# Patient Record
Sex: MALE | Race: WHITE
[De-identification: names, ages, dates, MRNs, and addresses within clinical notes are randomized per-mention and may not be internally consistent; named-entity substitution may affect disease eponyms.]

---

## 2018-03-20 ENCOUNTER — HOSPITAL ENCOUNTER (EMERGENCY)
Dept: HOSPITAL 75 - ER | Age: 18
Discharge: LEFT BEFORE DISCHARGE | End: 2018-03-20
Payer: MEDICAID

## 2018-03-20 ENCOUNTER — HOSPITAL ENCOUNTER (EMERGENCY)
Dept: HOSPITAL 75 - ER | Age: 18
Discharge: HOME | End: 2018-03-20
Payer: SELF-PAY

## 2018-03-20 VITALS — WEIGHT: 180 LBS | BODY MASS INDEX: 26.66 KG/M2 | HEIGHT: 69 IN

## 2018-03-20 DIAGNOSIS — M25.552: Primary | ICD-10-CM

## 2018-03-20 LAB
BASOPHILS # BLD AUTO: 0 10^3/UL (ref 0–0.1)
BASOPHILS NFR BLD AUTO: 1 % (ref 0–10)
BUN/CREAT SERPL: 16
CALCIUM SERPL-MCNC: 9.5 MG/DL (ref 8.5–10.1)
CHLORIDE SERPL-SCNC: 105 MMOL/L (ref 98–107)
CO2 SERPL-SCNC: 25 MMOL/L (ref 21–32)
CREAT SERPL-MCNC: 0.86 MG/DL (ref 0.6–1.3)
EOSINOPHIL # BLD AUTO: 0.1 10^3/UL (ref 0–0.3)
EOSINOPHIL NFR BLD AUTO: 1 % (ref 0–10)
ERYTHROCYTE [DISTWIDTH] IN BLOOD BY AUTOMATED COUNT: 12 % (ref 10–14.5)
GFR SERPLBLD BASED ON 1.73 SQ M-ARVRAT: > 60 ML/MIN
GLUCOSE SERPL-MCNC: 85 MG/DL (ref 70–105)
HCT VFR BLD CALC: 46 % (ref 40–54)
HGB BLD-MCNC: 15.7 G/DL (ref 13.3–17.7)
LYMPHOCYTES # BLD AUTO: 2.1 X 10^3 (ref 1–4)
LYMPHOCYTES NFR BLD AUTO: 34 % (ref 12–44)
MANUAL DIFFERENTIAL PERFORMED BLD QL: NO
MCH RBC QN AUTO: 32 PG (ref 25–34)
MCHC RBC AUTO-ENTMCNC: 34 G/DL (ref 32–36)
MCV RBC AUTO: 94 FL (ref 80–99)
MONOCYTES # BLD AUTO: 0.5 X 10^3 (ref 0–1)
MONOCYTES NFR BLD AUTO: 8 % (ref 0–12)
NEUTROPHILS # BLD AUTO: 3.5 X 10^3 (ref 1.8–7.8)
NEUTROPHILS NFR BLD AUTO: 56 % (ref 42–75)
PLATELET # BLD: 236 10^3/UL (ref 130–400)
PMV BLD AUTO: 10.4 FL (ref 7.4–10.4)
POTASSIUM SERPL-SCNC: 3.9 MMOL/L (ref 3.6–5)
RBC # BLD AUTO: 4.92 10^6/UL (ref 4.35–5.85)
SODIUM SERPL-SCNC: 138 MMOL/L (ref 135–145)
WBC # BLD AUTO: 6.2 10^3/UL (ref 4.3–11)

## 2018-03-20 PROCEDURE — 73502 X-RAY EXAM HIP UNI 2-3 VIEWS: CPT

## 2018-03-20 PROCEDURE — 99281 EMR DPT VST MAYX REQ PHY/QHP: CPT

## 2018-03-20 PROCEDURE — 86141 C-REACTIVE PROTEIN HS: CPT

## 2018-03-20 PROCEDURE — 36415 COLL VENOUS BLD VENIPUNCTURE: CPT

## 2018-03-20 PROCEDURE — 85025 COMPLETE CBC W/AUTO DIFF WBC: CPT

## 2018-03-20 PROCEDURE — 80048 BASIC METABOLIC PNL TOTAL CA: CPT

## 2018-03-20 NOTE — XMS REPORT
Encounter CCD: 2016 to 2017

 Created on: 2098



Rich Miller

External Reference #: [id number]

: 2000

Sex: Male



Demographics







 Address  [address] [place] [address]-

 

 Home Phone  [phone]

 

 Preferred Language  Unknown

 

 Marital Status  Single

 

 Yarsani Affiliation  None

 

 Race  White

 

 Ethnic Group  Not  or 





Author







 Author  Auto Generated

 

 Organization  [facility]

 

 Address  Unknown

 

 Phone  Unavailable







Care Team Providers







 Care Team Member Name  Role  Phone

 

 Provider, Unknown  CP  [phone]

 

 Arianna Basilio  PP  [phone]







Allergies, Adverse Reactions, Alerts







  



  Substance   Reaction   Status

 

  



  No Known Adverse Reactions    Active







Problem List







  



  Condition   Effective Dates   Status

 

  



  Acute nontraumatic kidney injury    Active

 

  



  Acute renal failure   2016   Active







Medications







    



  Medication   Instructions   Start Date   End Date   Status

 

    



  influenza virus   10/15/15 15:21:00 CDT, Routine, 0.5   10/15/2015   10/15/
2015   Completed



  vaccine, inactivated   mL, IM, 1 time only, 1 dose(s),   



   Stop date 10/15/15 15:21:00 CDT   







Immunizations







   



  Vaccine   Date   Status   Refusal Reason

 

   



  Influenza Virus, Inactivated   10/15/2015   Given

## 2018-03-20 NOTE — XMS REPORT
Continuity of Care Document

 Created on: 2018



BRIANDA LEON

External Reference #: [id number]

: 2000

Sex: Male



Demographics







 Address  [address] [place] [address]

 

 Home Phone  [phone]

 

 Preferred Language  Unknown

 

 Marital Status  Unknown

 

 Mandaen Affiliation  Unknown

 

 Race  Unknown

 

 Ethnic Group  Unknown





Author







 Author  [employer]

 

 Organization  [facility]

 

 Address  Unknown

 

 Phone  Unavailable







Care Team Providers







 Care Team Member Name  Role  Phone

 

 [employer]  Unavailable  Unavailable



                                    



Problems

                    





 Problem                          Status                          Onset Date   
                       Classification                          Date Reported   
                       Comments                          Source                
    

 

 Pre-renal acute kidney injury (disorder)                          Active      
                    2016                          Problem                
          2017                                                    [facility]                    

 

 Acute nontraumatic kidney injury (disorder)                          Active   
                                                 Problem                       
   2016                                                    [facility]                    

 

 No current problems or disability (context-dependent category)                
          Active                                                    Problem    
                      10/11/2015                                               
     [facility]                    



                                                                               
                                     



Medications

                    





 Medication                          Details                          Route    
                      Status                          Patient Instructions     
                     Ordering Provider                          Order Date     
                     Source                    

 

 influenza virus vaccine, inactivated                          10/15/15 15:21:
00 CDT, Routine, 0.5 mL, IM, 1 time only, 1 dose(s), Stop date 10/15/15 15:21:
00 CDT                                                    Inactive             
                                       [facility]                    



                                                                               
     



Allergies, Adverse Reactions, Alerts

                                                        



Immunizations

                    





 Immunization                          Date Given                          Site
                          Status                          Last Updated         
                 Comments                          Source                    

 

 Influenza Virus, Inactivated                          10/15/2015              
                                      completed                          
[facility]                    



                                                                               
     



Results

                    





 Order Name                          Results                          Value    
                      Reference Range                          Date            
              Interpretation                          Comments                 
         Source                    

 

 N Micro                          WBC Ur                          1-4          
                                            2016                          
[facility]                    

 

 N Micro                          RBC Ur                          1-4          
                                            2016                          
[facility]                    

 

 NUA                          Color Ur                          OTHER          
                                            2016                          
[facility]                    

 

 NUA                          Clarity Ur                          SL CLOUDY    
                                                  2016                   
       [facility]                    

 

 NUA                          Glucose Ur                          NEGATIVE     
                                                 2016                    
      [facility]                    

 

 NUA                          Ketones Ur                          NEGATIVE     
                                                 2016                    
      [facility]                    

 

 NUA                          Specific Gravity Ur                          
1.025                                                      2016          
                [facility]                    

 

 NUA                          pH Ur                          6.0               
                                       2016                          [facility]                    

 

 NUA                          Protein Ur                          TRACE        
                                              2016                       
   [facility]                    

 

 NUA                          Nitrite Ur                          NEGATIVE     
                                                 2016                    
      [facility]                    

 

 NUA                          Blood Ur                          NEGATIVE       
                                               2016                      
    [facility]                    

 

 NUA                          Leukocytes Ur                          NEGATIVE  
                                                    2016                 
         [facility]                    

 

 NUA                          N Add Micro                          Yes         
                                             2016                        
  [facility]                    

 

 Creat U                          Creatinine Ur Random                          
189.2 mg/dL                                                     2016     
                     [facility]                    

 

 Prot U                          Protein Ur Random                          7 mg
/dL                                                     2016             
             [facility]                    

 

 Prot U                          Protein/Creatinine Ur Random                  
        0.04                                                      2016   
                       [facility]                    

 

 Extra Ur                          Extra Urine                          Extra 
Specimen                                                      2016       
                   NA                          Added by [employer]

                          [facility]                
    

 

 BasMet                          Sodium                          139 mmol/L    
                       135 - 145                          2016           
               [facility]                    

 

 BasMet                          Potassium                          4.0 mmol/L 
                          3.5 - 5.2                          2016        
                  [facility]                    

 

 BasMet                          Chloride                          102 mmol/L  
                         99 - 112                          2016          
                [facility]                    

 

 BasMet                          Carbon Dioxide                          27 mmol
/L                           20 - 30                          2016       
                   [facility]                    

 

 BasMet                          Anion Gap                          10 mmol/L  
                         7 - 14                          2016            
              [facility]                    

 

 BasMet                          Calcium                          9.5 mg/dL    
                       8.6 - 10.5                          2016          
                [facility]                    

 

 BasMet                          Glucose                          88 mg/dL     
                      65 - 110                          2016             
             [facility]                    

 

 BasMet                          BUN                          20 mg/dL         
                  5 - 20                          2016                   
       [facility]                    

 

 BasMet                          Creatinine                          1.03 mg/dL
                           .35 - 1.13                          2016      
                    [facility]                    

 

 Hem                          Sample Hgb Level                          <15 mg/
dL                            - <=100                          2016      
                    [facility]                    

 

 CBC                          WBC                          8.87 x10(3) mcL     
                      4.50 - 11.00                          2016         
                 [facility]                    

 

 CBC                          RBC                          4.73 x10(6) mcL     
                      4.50 - 5.90                          2016          
                [facility]                    

 

 CBC                          HGB                          15.2 gm/dL          
                 13.5 - 17.5                          2016               
           [facility]                    

 

 CBC                          HCT                          43.9 %              
             41.0 - 53.0                          2016                   
       [facility]                    

 

 CBC                          MCV                          92.8 fL             
              82.0 - 100.0                          2016                 
         [facility]                    

 

 CBC                          MCH                          32.1 pg             
              26.0 - 34.0                          2016                  
        [facility]                    

 

 CBC                          MCHC                          34.6 gm/dL         
                  31.5 - 36.5                          2016              
            [facility]                    

 

 CBC                          RDW                          11.9 %              
             11.5 - 14.5                          2016                   
       [facility]                    

 

 CBC                          Platelet                          207 x10(3) mcL 
                          150 - 450                          2016        
                  [facility]                    

 

 CBC                          MPV                          10.8 fL             
              8.2 - 12.4                          2016                   
       [facility]                    

 

 Nephrology Letter                          Nephrology Letter                  
        2016



Kristin Johanson, MD

 [address] [place] [address] 



Re:   BRIANDA LEON

:  2000

[facility]#: [id number]



Dear Dr. Johanson:



I saw Brianda Leon in our Nephrology Clinic on 2016.  He was 
accompanied by his paternal grandfather.  He was here for follow-up following 
discharge from the hospital for acute kidney injury.



Brianda is a 15-year 9-month old boy.  In brief, he was seen in the local 
Emergency Room on 2015, for emesis, nausea and vague pain. On 
presentation to emergency room, he had a serum creatinine of 3 mg/dL.  There 
was a concern for appendicitis and he underwent a CT scan with contrast, which 
showed mesenteric adenitis and a mass over the iliac bone.  The serum 
creatinine continued to rise in the local hospital and had increased from 3.0 
to 3.4 mg/dL to 4.0 mg/dL, which led to transfer of his care to [facility].  His blood work on admission on 2015, showed a 
sodium of 139, potassium 4.5, chloride 107, and CO2 of 23 mmol/L.  The BUN and 
creatinine were 27 and 4.01 mg/dL, respectively.  His urinalysis showed a 
specific gravity of 1.004 and a pH of 5.5.  The urine dipstick was negative for 
blood glucose, protein, nitrites, ketones and leukocytes.  The urine protein/ 
creatinine ratio was 0.33.  His serum complements were normal (C3-109 mg/dL and 
C4 of 19.7 mg/dL).  His SERGEI titers, p-ANCA and C- ANCA titers were negative.  
His liver enzymes were normal.  His serum LDH, creatine kinase and uric acid 
were normal.  He underwent a renal ultrasound with Doppler on 2015, 
which showed echogenic kidneys with the right kidney measuring 12.1 cm and the 
left kidney measuring 11.2 cm.  The renal Doppler flow was normal.  We observed 
him in the hospital.  He was on a restricted potassium and phosphorus diet 
given his acute renal failure. He continued to have good urine output with 
stable electrolytes.  He was discharged on a renal diet with plans to have a 
follow-up blood work in a weeks time. His discharge blood work on October 10, 
2015, showed sodium 139, potassium 4.5, chloride 101 and CO2 of 28 mmol/L.  The 
BUN and creatinine had decreased down to 18 and 1.34 mg/dL.  



The blood work done on October 15, 2015, showed sodium 141, potassium 4.7, 
chloride 99 and CO2 of 29 mmol/L.  The BUN and creatinine were 14 and 1.55 mg/
dL.



The blood work on 2015 showed sodium 141, potassium 4.8, chloride 
101, and CO2 28 mmol/L. The BUN and creatinine were 17 and 1.01 mg/dL.  Serum 
calcium and phosphorus were 10.5 and 3.7 mg/dL respectively with a serum PTH of 
94 pg/mL.  His blood count showed a hemoglobin of 15.5 g/dL with a hematocrit 
of 44.8%.  His white cell count was 5,480 and a platelet count of 196,000. The 
serum albumin was 5.0 g/dl. The serum Vitamin D level was 30 ng/mL. The urine 
protein/ creatinine ratio was normal (0.06)



The blood work on 2015 showed sodium 141, potassium 4.3, chloride 
100, and CO2 27 mmol/L.  The BUN and creatinine were 16 and 1.19 mg/dL.  Serum 
phosphorus was 3.8 mg/dL. The blood count showed a hemoglobin of 15.2 g/dL with 
a hematocrit of 47.0%.  His white cell count was 4,700 and a platelet count of 
246,000. 



The blood work on 2016 showed sodium 139, potassium 4.6, chloride 
102, and CO2 28 mmol/L.  The BUN and creatinine were 19 and 0.96 mg/dL. The 
urine protein/ creatinine ratio was normal (0.05). The follow up renal 
ultrasound completed on 16 at an outside facility was reported to be 
normal.  



He was last seen on 2016. He has done well in the interim period. 
He denies having changes in his urine volume, abnormal urine smell or 
appearance. He denies voiding symptoms or hematuria.



The orthopedic team was consulted for the bone mass, and went on to have an MRI 
scan on their recommendation.  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 
unicameral bone cyst, nonossifying fibroma, etc. He was seen by Orthopedic 
surgeon (Dr Fernandes) in [facility] in 2015. He underwent a bone 
biopsy which ruled out a malignancy. He underwent an injection. The last 
evaluation showed marked improvement in the bone lesion. He is to have a follow 
up with him in 2016. 



The past medical history is significant for hospitalization at [facility] with acute kidney injury from  to October 10, 2015.  His 
surgical procedures have consisted of surgical excision of stye over his right 
eye and bone biopsy. He does not have any other chronic illness.



He is the oldest of the children.  He has 2 full brothers and 1 full sister, 
and one half-sister.

The family history is negative for kidney disease or kidney stone on the 
paternal side of the family. The family history is incomplete on the maternal 
side of the family, but the paternal grandparents are not aware of any kidney 
disease or kidney stone on the maternal side of the family.  



He lives with his grandparents.  He is in 12th grade. They have moved to [facility].



All other review of systems not mentioned in HPI are negative.



He is not allergic to any medications.

His current medications are:

No Medications



On examination, his blood pressure was 119/66 mmHg.  His height is 178.4 cm (
63rd percentile), weight 75.1 kg (75th percentile) and a BMI of 23.6 kg/sq m (
72nd percentile).

HEENT:  The eye examination was normal. Pupils were round and reactive. The EOM 
were complete and normal. The ear drums were normal. Clear palate, supple neck 
with no lymphadenopathy or thyromegaly.

CHEST:  The heart sounds were normal. There were no murmurs. The lung fields 
were clear to auscultation.  

ABDOMEN:  The abdomen was soft on palpation.  There was no palpable kidney, 
bladder, or abdominal mass. 

EXTERNAL GENITALIA: The external genital examination was deferred.

BACK:  Examination of the back was normal, showing no abnormalities and no CVA 
tenderness. 

EXTREMITIES:  Extremities were normal, without evidence of rash, edema, or 
joint abnormalities.  Range of motion was normal in all four extremities.  No 
tenderness was noted over the musculoskeletal system.



Urinalysis performed today showed a specific gravity of 1.025 and a pH of 6.  
The urine dipstick shored trace protein and was negative for glucose, ketones, 
nitrites, blood, and leukocytes.  The urine sediment was unremarkable.



In summary, Brianda Leon is a 15-year 9-month old boy who is being followed in 
the Nephrology Clinic for acute kidney injury in 2015, possibly from 
acute tubular interstitial nephritis and further aggravated by contrast-induced 
nephropathy.



I have recommend that he will have blood work today.  If the serum creatinine 
shows continued decrease and/or has plateaued, then we will repeat his blood 
work in 6 month's time.  If his serum creatinine has risen, then we may 
consider a renal biopsy.  The family will come here prepared for a 4-day stay 
in case we need to treat him with high-dose steroid therapy following the 
biopsy.



I have arranged for him to have blood work and urine chemistry today. 



I would like to see him in 12 months' time for another 2 years with blood and 
urine evaluation before we discharge him from the clinic. Thank you for the 
referral.  I will be glad to answer any questions that you may have regarding 
Brianda's care.



Sincerely,





OCTAVIANO FISCHER MD



CC: Parents of Brianda Leon



Addendum:

The blood work shows normal electrolytes, normal renal function, and normal 
urinary protein excretion. We will repeat his blood work as discussed in 12 
month. We will follow him as scheduled. He does not need a biopsy based on the 
current blood work results. 



Collection: 2016 10:27

CHEMISTRY - URINE

 Creatinine Ur Random                      189.2           mg/dL               
             

 Protein Ur Random                         7               mg/dL               
             

 Protein/Creatinine Ur Random              0.04                                
             

Collection: 2016 11:19

HEMATOLOGY       

 WBC                                       8.87            x10(3) mcL   4.50 - 
11.00        

 HGB                                       15.2            gm/dL        13.5 - 
17.5         

 HCT                                       43.9            %            41.0 - 
53.0         

 Platelet                                  207             x10(3) mcL   150 - 
450           

 RBC                                       4.73            x10(6) mcL   4.50 - 
5.90         

 MCV                                       92.8            fL           82.0 - 
100.0        

 MCH                                       32.1            pg           26.0 - 
34.0         

 MCHC                                      34.6            gm/dL        31.5 - 
36.5         

 RDW                                       11.9            %            11.5 - 
14.5         

 MPV                                       10.8            fL           8.2 - 
12.4          



CHEMISTRY        

 Sodium                                    139             mmol/L       135 - 
145           

 Potassium                                 4.0             mmol/L       3.5 - 
5.2           

 Chloride                                  102             mmol/L       99 - 
112            

 Carbon Dioxide                            27              mmol/L       20 - 30
             

 Anion Gap                                 10              mmol/L       7 - 14 
             

 Calcium                                   9.5             mg/dL        8.6 - 
10.5          

 Glucose                                   88              mg/dL        65 - 
110            

 BUN                                       20              mg/dL        5 - 20 
             

 Creatinine                                1.03            mg/dL        .35 - 
1.13

                                                      2016               
                                     Provider Name: Octaviano Fischer MD

Electronically Signed On:  16 03:46 PM

                          [facility]                
    

 

 CT Pelvis w/o Contrast                          CT Pelvis w/o Contrast        
                 



[facility]

Department of Radiology

 [address] [place] [id number] [phone]



Patient: Brianda Leon



MRN: [id number]

: 2000



Study Date/Time: 2016 10:50:00

Accession: [id number]

Order ID: [id number] 

Procedure Code: [id number]

Procedure Description: CT Pelvis w/o Contrast

Reason for Study: 



Reason for exam: Left ileal lesion, status post biopsy and doxycycline ablation



Comparison: 2016



Technique: Axial images were obtained through the pelvis. Coronal and sagittal

reconstructions were made.



Sedation: None



Findings:



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

demonstrated. The size is stable when compared with the most recent CT. No

pathologic fracture is seen. There appears to be some increased peripheral bone

production within the more inferior and mid lytic components of this lesion when

compared with prior CT scan. This may be related to doxycycline treatment.



Both hips remain well seated and there is no hip joint effusion. Adjacent

muscles appear normal. No new bony lesion is seen.



Impression:



Stable overall size of the left iliac bone expansile lytic and sclerotic lesion.

There appears to be increased peripheral bone production involving the more

inferior and mid lytic components of this lesion. Possibly related to

doxycycline treatment?







Dictated On   : 2016 11:22:50

Interpreted By: Orly Kay ([facility])

Transcribed By: [email or web address]

Signed By     :Orly Kay ([facility]) - 2016 11:53:50

                                                      2016               
                                     Signed (Electronic Signature):  Orly Kay DO  2016 11:53 am

Dictated by:  Orly Kay DO

                          [facility]                
    

 

 N Micro                          WBC Ur                          None         
                                             2016                        
  [facility]                    

 

 N Micro                          RBC Ur                          None         
                                             2016                        
  [facility]                    

 

 N Micro                          Renal Epi Ur                          None   
                                                   2016                  
        [facility]                    

 

 NUA                          Color Ur                          YELLOW         
                                             2016                        
  [facility]                    

 

 NUA                          Clarity Ur                          CLEAR        
                                              2016                       
   [facility]                    

 

 NUA                          Glucose Ur                          NEGATIVE     
                                                 2016                    
      [facility]                    

 

 NUA                          Ketones Ur                          NEGATIVE     
                                                 2016                    
      [facility]                    

 

 NUA                          Specific Gravity Ur                          
1.020                                                      2016          
                [facility]                    

 

 NUA                          pH Ur                          6.0               
                                       2016                          [facility]                    

 

 NUA                          Protein Ur                          NEGATIVE     
                                                 2016                    
      [facility]                    

 

 NUA                          Nitrite Ur                          NEGATIVE     
                                                 2016                    
      [facility]                    

 

 NUA                          Blood Ur                          NEGATIVE       
                                               2016                      
    [facility]                    

 

 NUA                          Leukocytes Ur                          NEGATIVE  
                                                    2016                 
         [facility]                    

 

 Creat U                          Creatinine Ur Random                          
133.3 mg/dL                                                     2016     
                     [facility]                    

 

 Prot U                          Protein Ur Random                          6 mg
/dL                                                     2016             
             [facility]                    

 

 Prot U                          Protein/Creatinine Ur Random                  
        0.05                                                      2016   
                       [facility]                    

 

 BasMet                          Sodium                          139 mmol/L    
                       135 - 145                          2016           
               [facility]                    

 

 BasMet                          Potassium                          4.6 mmol/L 
                          3.5 - 5.2                          2016        
                  [facility]                    

 

 BasMet                          Chloride                          102 mmol/L  
                         99 - 112                          2016          
                [facility]                    

 

 BasMet                          Carbon Dioxide                          28 mmol
/L                           20 - 30                          2016       
                   [facility]                    

 

 BasMet                          Anion Gap                          9 mmol/L   
                        7 - 14                          2016             
             [facility]                    

 

 BasMet                          Calcium                          9.2 mg/dL    
                       8.6 - 10.5                          2016          
                [facility]                    

 

 BasMet                          Glucose                          87 mg/dL     
                      65 - 110                          2016             
             [facility]                    

 

 BasMet                          BUN                          19 mg/dL         
                  5 - 20                          2016                   
       [facility]                    

 

 BasMet                          Creatinine                          .96 mg/dL 
                          .35 - 1.13                          2016       
                   [facility]                    

 

 Vit D250H                          Vitamin D 25-OH D2                          
<5 ng/mL                                                     10/27/2015        
                  [facility]                    

 

 Vit D250H                          Vitamin D 25-OH D3                          
30 ng/mL                                                     10/27/2015        
                  [facility]                    

 

 Vit D250H                          Vitamin D 25-OH D2 D3 (Total)              
            30 ng/mL                           30 - 100                        
  10/27/2015                                                    Total 25-
Hydroxyvitamin D (D2 +D3) levels between 15-29 ng/mL suggest insufficiency, 
while levels <15 ng/mL suggest deficiency

This test was developed and its performance characteristics determined

 by [facility] laboratories.  It has not been cleared or approved by the [place] 

Food and Drug Administration.  The test does not require FDA approval. 

Additional information regarding test use will be provided upon request.

                          [facility]                
    

 

 NUA                          Color Ur                          YELLOW         
                                             10/26/2015                        
  [facility]                    

 

 NUA                          Clarity Ur                          CLEAR        
                                              10/26/2015                       
   [facility]                    

 

 NUA                          Glucose Ur                          NEGATIVE     
                                                 10/26/2015                    
      [facility]                    

 

 NUA                          Ketones Ur                          NEGATIVE     
                                                 10/26/2015                    
      [facility]                    

 

 NUA                          Specific Gravity Ur                          
1.015                                                      10/26/2015          
                [facility]                    

 

 NUA                          pH Ur                          6.0               
                                       10/26/2015                          [facility]                    

 

 NUA                          Protein Ur                          NEGATIVE     
                                                 10/26/2015                    
      [facility]                    

 

 NUA                          Nitrite Ur                          NEGATIVE     
                                                 10/26/2015                    
      [facility]                    

 

 NUA                          Blood Ur                          NEGATIVE       
                                               10/26/2015                      
    [facility]                    

 

 NUA                          Leukocytes Ur                          NEGATIVE  
                                                    10/26/2015                 
         [facility]                    

 

 Creat U                          Creatinine Ur Random                          
107.9 mg/dL                                                     10/26/2015     
                     [facility]                    

 

 Prot U                          Protein Ur Random                          6 mg
/dL                                                     10/26/2015             
             [facility]                    

 

 Prot U                          Protein/Creatinine Ur Random                  
        0.06                                                      10/26/2015   
                       [facility]                    

 

 Ferritin                          Ferritin                          72 ng/mL  
                         27 - 265                          10/26/2015          
                [facility]                    

 

 PTH Intact                          PTH Intact                          25.4 pg
/mL                           10.0 - 89.0                          10/26/2015  
                        [facility]                    

 

 TIBC                          TIBC                          337 mcg/dL        
                   224 - 435                          10/26/2015               
           [facility]                    

 

 BasMet                          Sodium                          141 mmol/L    
                       135 - 145                          10/26/2015           
               [facility]                    

 

 BasMet                          Potassium                          4.8 mmol/L 
                          3.5 - 5.2                          10/26/2015        
                  [facility]                    

 

 BasMet                          Chloride                          101 mmol/L  
                         99 - 112                          10/26/2015          
                [facility]                    

 

 BasMet                          Carbon Dioxide                          28 mmol
/L                           20 - 30                          10/26/2015       
                   [facility]                    

 

 BasMet                          Anion Gap                          12 mmol/L  
                         7 - 14                          10/26/2015            
              [facility]                    

 

 BasMet                          Calcium                          10.5 mg/dL   
                        8.6 - 10.5                          10/26/2015         
                 [facility]                    

 

 BasMet                          Glucose                          84 mg/dL     
                      65 - 110                          10/26/2015             
             [facility]                    

 

 BasMet                          BUN                          17 mg/dL         
                  5 - 20                          10/26/2015                   
       [facility]                    

 

 BasMet                          Creatinine                          1.01 mg/dL
                           .35 - 1.13                          10/26/2015      
                    [facility]                    

 

 BasMet                          Creatinine, Old Calibration                   
       1.2 mg/dL                           0.5 - 1.3                          10
/                                                    This creatinine 
value is a calculated value from the newly implemented IDMS calibration.  It 
represents the value equivalent to what was previously reported by the 
laboratory.

                          [facility]                
    

 

 HepFun                          Protein Total                          8.2 gm/
dL                           6.5 - 8.3                          10/26/2015     
                     [facility]                    

 

 HepFun                          Albumin                          5.0 gm/dL    
                       3.0 - 5.1                          10/26/2015           
               [facility]                    

 

 HepFun                          Bilirubin, Total                          0.7 
mg/dL                           0.0 - 1.2                          10/26/2015  
                        [facility]                    

 

 HepFun                          Bilirubin, Direct                          0.3 
mg/dL                           0.0 - 0.4                          10/26/2015  
                        [facility]                    

 

 HepFun                          Bilirubin, Indirect                          
0.4 mg/dL                           0.0 - 1.2                          10/26/
2015                          [facility]                    

 

 HepFun                          AST                          26 unit/L        
                   12 - 50                          10/26/2015                 
         [facility]                    

 

 HepFun                          ALT                          49 unit/L        
                   5 - 50                          10/26/2015                  
        [facility]                    

 

 HepFun                          Alk Phos                          94 unit/L   
                        50 - 130                          10/26/2015           
               [facility]                    

 

 Iron                          Iron                          105 mcg/dL        
                   50 - 180                          10/26/2015                
          [facility]                    

 

 Phos                          Phosphorus                          3.7 mg/dL   
                        2.3 - 4.8                          10/26/2015          
                [facility]                    

 

 DIFA                          Differential Method                          
Auto Diff                                                      10/26/2015      
                    [facility]                    

 

 CBCD                          WBC                          5.48 x10(3) mcL    
                       4.50 - 11.00                          10/26/2015        
                  [facility]                    

 

 CBCD                          RBC                          4.87 x10(6) mcL    
                       4.50 - 5.90                          10/26/2015         
                 [facility]                    

 

 CBCD                          HGB                          15.5 gm/dL         
                  13.5 - 17.5                          10/26/2015              
            [facility]                    

 

 CBCD                          HCT                          44.8 %             
              41.0 - 53.0                          10/26/2015                  
        [facility]                    

 

 CBCD                          MCV                          92.0 fL            
               82.0 - 100.0                          10/26/2015                
          [facility]                    

 

 CBCD                          MCH                          31.8 pg            
               26.0 - 34.0                          10/26/2015                 
         [facility]                    

 

 CBCD                          MCHC                          34.6 gm/dL        
                   31.5 - 36.5                          10/26/2015             
             [facility]                    

 

 CBCD                          RDW                          12.0 %             
              11.5 - 14.5                          10/26/2015                  
        [facility]                    

 

 CBCD                          Platelet                          196 x10(3) mcL
                           150 - 450                          10/26/2015       
                   [facility]                    

 

 CBCD                          MPV                          11.0 fL            
               8.2 - 12.4                          10/26/2015                  
        [facility]                    

 

 DIFA                          % Neutro                          60.6 %        
                                             10/26/2015                        
  [facility]                    

 

 DIFA                          % Imm Gran                          0.2 %       
                                              10/26/2015                       
   NA                          This number represents the sum of the 
metamyelocytes, myelocytes and promyelocytes.

                          [facility]                
    

 

 DIFA                          % Lymph                          31.6 %         
                                            10/26/2015                          
[facility]                    

 

 DIFA                          % Mono                          6.0 %           
                                          10/26/2015                          
[facility]                    

 

 DIFA                          % Eos                          1.1 %            
                                         10/26/2015                          [facility]                    

 

 DIFA                          % Baso                          0.5 %           
                                          10/26/2015                          
[facility]                    

 

 DIFA                          Abs Neut                          3.32 x10(3) 
mcL                           1.80 - 7.00                          10/26/2015  
                        [facility]                    

 

 DIFA                          Abs Imm Gran                          0.01 x10(3
) mcL                           0.00 - 0.04                          10/26/2015
                          [facility]                    

 

 DIFA                          Abs Lymph                          1.73 x10(3) 
mcL                           1.20 - 4.00                          10/26/2015  
                        [facility]                    

 

 DIFA                          Abs Mono                          0.33 x10(3) 
mcL                           0.10 - 0.80                          10/26/2015  
                        [facility]                    

 

 DIFA                          Abs Eos                          0.06 x10(3) mcL
                           0.00 - 0.50                          10/26/2015     
                     [facility]                    

 

 DIFA                          Abs Baso                          0.03 x10(3) 
mcL                           0.00 - 0.10                          10/26/2015  
                        [facility]                    

 

 BasMet                          Sodium                          141 mmol/L    
                       135 - 145                          10/15/2015           
               [facility]                    

 

 BasMet                          Potassium                          4.7 mmol/L 
                          3.5 - 5.2                          10/15/2015        
                  [facility]                    

 

 BasMet                          Chloride                          99 mmol/L   
                        99 - 112                          10/15/2015           
               [facility]                    

 

 BasMet                          Carbon Dioxide                          29 mmol
/L                           20 - 30                          10/15/2015       
                   [facility]                    

 

 BasMet                          Anion Gap                          13 mmol/L  
                         7 - 14                          10/15/2015            
              [facility]                    

 

 BasMet                          Calcium                          9.9 mg/dL    
                       8.6 - 10.5                          10/15/2015          
                [facility]                    

 

 BasMet                          Glucose                          74 mg/dL     
                      65 - 110                          10/15/2015             
             [facility]                    

 

 BasMet                          BUN                          14 mg/dL         
                  5 - 20                          10/15/2015                   
       [facility]                    

 

 BasMet                          Creatinine                          1.55 mg/dL
                           .35 - 1.13                          10/15/2015      
                    [facility]                    

 

 BasMet                          Creatinine, Old Calibration                   
       1.7 mg/dL                           0.5 - 1.3                          10
/15/2015                          HI                          This creatinine 
value is a calculated value from the newly implemented IDMS calibration.  It 
represents the value equivalent to what was previously reported by the 
laboratory.

                          [facility]                
    

 

 BasMet                          Sodium                          139 mmol/L    
                       135 - 145                          10/10/2015           
               [facility]                    

 

 BasMet                          Potassium                          4.5 mmol/L 
                          3.5 - 5.2                          10/10/2015        
                  [facility]                    

 

 BasMet                          Chloride                          101 mmol/L  
                         99 - 112                          10/10/2015          
                [facility]                    

 

 BasMet                          Carbon Dioxide                          28 mmol
/L                           20 - 30                          10/10/2015       
                   [facility]                    

 

 BasMet                          Anion Gap                          10 mmol/L  
                         7 - 14                          10/10/2015            
              [facility]                    

 

 BasMet                          Calcium                          10.0 mg/dL   
                        8.6 - 10.5                          10/10/2015         
                 [facility]                    

 

 BasMet                          Glucose                          85 mg/dL     
                      65 - 110                          10/10/2015             
             [facility]                    

 

 BasMet                          BUN                          18 mg/dL         
                  5 - 20                          10/10/2015                   
       [facility]                    

 

 BasMet                          Creatinine                          1.34 mg/dL
                           .35 - 1.13                          10/10/2015      
                    [facility]                    

 

 BasMet                          Creatinine, Old Calibration                   
       1.5 mg/dL                           0.5 - 1.3                          10
/10/2015                          HI                          This creatinine 
value is a calculated value from the newly implemented IDMS calibration.  It 
represents the value equivalent to what was previously reported by the 
laboratory.

                          [facility]                
    

 

 BasMet                          Sodium                          143 mmol/L    
                       135 - 145                          10/09/2015           
               [facility]                    

 

 BasMet                          Potassium                          4.8 mmol/L 
                          3.5 - 5.2                          10/09/2015        
                  [facility]                    

 

 BasMet                          Chloride                          106 mmol/L  
                         99 - 112                          10/09/2015          
                [facility]                    

 

 BasMet                          Carbon Dioxide                          25 mmol
/L                           20 - 30                          10/09/2015       
                   [facility]                    

 

 BasMet                          Anion Gap                          12 mmol/L  
                         7 - 14                          10/09/2015            
              [facility]                    

 

 BasMet                          Calcium                          9.7 mg/dL    
                       8.6 - 10.5                          10/09/2015          
                [facility]                    

 

 BasMet                          Glucose                          67 mg/dL     
                      65 - 110                          10/09/2015             
             [facility]                    

 

 BasMet                          BUN                          20 mg/dL         
                  5 - 20                          10/09/2015                   
       [facility]                    

 

 BasMet                          Creatinine                          1.47 mg/dL
                           .35 - 1.13                          10/09/2015      
                    HI                          This test has failed a delta 
check, as defined in the Chemistry Laboratory Policy.

 1.  Investigate possible clerical error.  If found, complete an incident 
report.

The above investigations were performed by [email or web address] at 10/09/2015 11:18:07 CDT.

                          [facility]                
    

 

 BasMet                          Creatinine, Old Calibration                   
       1.7 mg/dL                           0.5 - 1.3                          10
/2015                          HI                          This creatinine 
value is a calculated value from the newly implemented IDMS calibration.  It 
represents the value equivalent to what was previously reported by the 
laboratory.

                          [facility]                
    

 

 DIFA                          Differential Method                          
Auto Diff                                                      10/09/2015      
                    [facility]                    

 

 CBCD                          WBC                          7.77 x10(3) mcL    
                       4.50 - 11.00                          10/09/2015        
                  [facility]                    

 

 CBCD                          RBC                          4.43 x10(6) mcL    
                       4.50 - 5.90                          10/09/2015         
                 LOW                                                    [facility]                    

 

 CBCD                          HGB                          14.3 gm/dL         
                  13.5 - 17.5                          10/09/2015              
            [facility]                    

 

 CBCD                          HCT                          41.2 %             
              41.0 - 53.0                          10/09/2015                  
        [facility]                    

 

 CBCD                          MCV                          93.0 fL            
               82.0 - 100.0                          10/09/2015                
          [facility]                    

 

 CBCD                          MCH                          32.3 pg            
               26.0 - 34.0                          10/09/2015                 
         [facility]                    

 

 CBCD                          MCHC                          34.7 gm/dL        
                   31.5 - 36.5                          10/09/2015             
             [facility]                    

 

 CBCD                          RDW                          12.2 %             
              11.5 - 14.5                          10/09/2015                  
        [facility]                    

 

 CBCD                          Platelet                          163 x10(3) mcL
                           150 - 450                          10/09/2015       
                   [facility]                    

 

 CBCD                          MPV                          11.2 fL            
               8.2 - 12.4                          10/09/2015                  
        [facility]                    

 

 DIFA                          % Neutro                          62.4 %        
                                             10/09/2015                        
  [facility]                    

 

 DIFA                          % Imm Gran                          0.1 %       
                                              10/09/2015                       
   NA                          This number represents the sum of the 
metamyelocytes, myelocytes and promyelocytes.

                          [facility]                
    

 

 DIFA                          % Lymph                          26.9 %         
                                            10/09/2015                          
[facility]                    

 

 DIFA                          % Mono                          8.8 %           
                                          10/09/2015                          
[facility]                    

 

 DIFA                          % Eos                          1.3 %            
                                         10/09/2015                          [facility]                    

 

 DIFA                          % Baso                          0.5 %           
                                          10/09/2015                          
[facility]                    

 

 DIFA                          Abs Neut                          4.85 x10(3) 
mcL                           1.80 - 7.00                          10/09/2015  
                        [facility]                    

 

 DIFA                          Abs Imm Gran                          0.01 x10(3
) mcL                           0.00 - 0.04                          10/09/2015
                          [facility]                    

 

 DIFA                          Abs Lymph                          2.09 x10(3) 
mcL                           1.20 - 4.00                          10/09/2015  
                        NA                                                    
[facility]                    

 

 DIFA                          Abs Mono                          0.68 x10(3) 
mcL                           0.10 - 0.80                          10/09/2015  
                        [facility]                    

 

 DIFA                          Abs Eos                          0.10 x10(3) mcL
                           0.00 - 0.50                          10/09/2015     
                     [facility]                    

 

 DIFA                          Abs Baso                          0.04 x10(3) 
mcL                           0.00 - 0.10                          10/09/2015  
                        [facility]                    

 

 CytNeut Ab                          ANCA-Cytoplasmic                          
Negative                            Negative                          10/08/
2015                          [facility]                    

 

 CytNeut Ab                          ANCA-Perinuclear                          
Negative                            Negative                          10/08/
2015                          NA                          Negative for cANCA 
and pANCA patterns by immunofluorescence.

Test Performed by:

[place] [id number]

: Marlon Josue II, M.D., Ph.D.[facility]                
    

 

 ANCA Panel                          Proteinase 3 Antibody (PR3)               
           <0.2 unit(s)                           <0.4 (Negative)              
            10/08/2015                          NA                          
Test Performed by:

[place] [id number]

: Marlon Josue II, M.D., Ph.D.[facility]                
    

 

 ANCA Panel                          Myeloperoxidase Ab                        
  <0.2 unit(s)                           <0.4 (Negative)                       
   10/08/2015                          [facility]                    

 

 BasMet                          Sodium                          143 mmol/L    
                       135 - 145                          10/08/2015           
               [facility]                    

 

 BasMet                          Potassium                          4.0 mmol/L 
                          3.5 - 5.2                          10/08/2015        
                  [facility]                    

 

 BasMet                          Chloride                          106 mmol/L  
                         99 - 112                          10/08/2015          
                [facility]                    

 

 BasMet                          Carbon Dioxide                          25 mmol
/L                           20 - 30                          10/08/2015       
                   [facility]                    

 

 BasMet                          Anion Gap                          12 mmol/L  
                         7 - 14                          10/08/2015            
              [facility]                    

 

 BasMet                          Calcium                          8.5 mg/dL    
                       8.6 - 10.5                          10/08/2015          
                [facility]                    

 

 BasMet                          Glucose                          94 mg/dL     
                      65 - 110                          10/08/2015             
             [facility]                    

 

 BasMet                          BUN                          24 mg/dL         
                  5 - 20                          10/08/2015                   
       [facility]                    

 

 BasMet                          Creatinine                          2.45 mg/dL
                           .35 - 1.13                          10/08/2015      
                    [facility]                    

 

 BasMet                          Creatinine, Old Calibration                   
       2.7 mg/dL                           0.5 - 1.3                          10
/2015                          HI                          This creatinine 
value is a calculated value from the newly implemented IDMS calibration.  It 
represents the value equivalent to what was previously reported by the 
laboratory.

                          [facility]                
    

 

 DIFA                          Differential Method                          
Auto Diff                                                      10/08/2015      
                    [facility]                    

 

 CBCD                          WBC                          7.54 x10(3) mcL    
                       4.50 - 11.00                          10/08/2015        
                  [facility]                    

 

 CBCD                          RBC                          4.14 x10(6) mcL    
                       4.50 - 5.90                          10/08/2015         
                 [facility]                    

 

 CBCD                          HGB                          13.1 gm/dL         
                  13.5 - 17.5                          10/08/2015              
            [facility]                    

 

 CBCD                          HCT                          38.5 %             
              41.0 - 53.0                          10/08/2015                  
        [facility]                    

 

 CBCD                          MCV                          93.0 fL            
               82.0 - 100.0                          10/08/2015                
          [facility]                    

 

 CBCD                          MCH                          31.6 pg            
               26.0 - 34.0                          10/08/2015                 
         [facility]                    

 

 CBCD                          MCHC                          34.0 gm/dL        
                   31.5 - 36.5                          10/08/2015             
             [facility]                    

 

 CBCD                          RDW                          12.3 %             
              11.5 - 14.5                          10/08/2015                  
        [facility]                    

 

 CBCD                          Platelet                          163 x10(3) mcL
                           150 - 450                          10/08/2015       
                   [facility]                    

 

 CBCD                          MPV                          11.1 fL            
               8.2 - 12.4                          10/08/2015                  
        [facility]                    

 

 DIFA                          % Neutro                          66.0 %        
                                             10/08/2015                        
  [facility]                    

 

 DIFA                          % Imm Gran                          0.1 %       
                                              10/08/2015                       
   NA                          This number represents the sum of the 
metamyelocytes, myelocytes and promyelocytes.

                          [facility]                
    

 

 DIFA                          % Lymph                          23.5 %         
                                            10/08/2015                          
[facility]                    

 

 DIFA                          % Mono                          8.8 %           
                                          10/08/2015                          
[facility]                    

 

 DIFA                          % Eos                          1.3 %            
                                         10/08/2015                          [facility]                    

 

 DIFA                          % Baso                          0.3 %           
                                          10/08/2015                          
[facility]                    

 

 DIFA                          Abs Neut                          4.98 x10(3) 
mcL                           1.80 - 7.00                          10/08/2015  
                        [facility]                    

 

 DIFA                          Abs Imm Gran                          0.01 x10(3
) mcL                           0.00 - 0.04                          10/08/2015
                          [facility]                    

 

 DIFA                          Abs Lymph                          1.77 x10(3) 
mcL                           1.20 - 4.00                          10/08/2015  
                        [facility]                    

 

 DIFA                          Abs Mono                          0.66 x10(3) 
mcL                           0.10 - 0.80                          10/08/2015  
                        [facility]                    

 

 DIFA                          Abs Eos                          0.10 x10(3) mcL
                           0.00 - 0.50                          10/08/2015     
                     [facility]                    

 

 DIFA                          Abs Baso                          0.02 x10(3) 
mcL                           0.00 - 0.10                          10/08/2015  
                        [facility]                    

 

 SERGEI                          Anti-Nuclear AB Screen                          
8.41 unit(s)                            - <=19.99                          10/08
/2015                                                    Interpretation:

   < 20=Negative

 20 - 60=Moderate Positive

   >60=Strong Positive

The SERGEI Index results were obtained with the [employer] QUANT[employer] LiteTM SERGEI JODIE. SERGEI 
values obtained with different manufacturers assay methods may not be used 
interchangeably. The magnitude of the reported IgG levels cannot be correlated 
to an endpoint titer.

                          [facility]                
    

 

 HepFun                          Protein Total                          6.8 gm/
dL                           6.5 - 8.3                          10/07/2015     
                     [facility]                    

 

 HepFun                          Albumin                          4.1 gm/dL    
                       3.0 - 5.1                          10/07/2015           
               [facility]                    

 

 HepFun                          Bilirubin, Total                          0.5 
mg/dL                           0.0 - 1.2                          10/07/2015  
                        [facility]                    

 

 HepFun                          Bilirubin, Direct                          0.2 
mg/dL                           0.0 - 0.4                          10/07/2015  
                        [facility]                    

 

 HepFun                          Bilirubin, Indirect                          
0.3 mg/dL                           0.0 - 1.2                          10/07/
2015                          [facility]                    

 

 HepFun                          AST                          16 unit/L        
                   12 - 50                          10/07/2015                 
         [facility]                    

 

 HepFun                          ALT                          47 unit/L        
                   5 - 50                          10/07/2015                  
        [facility]                    

 

 HepFun                          Alk Phos                          87 unit/L   
                        50 - 130                          10/07/2015           
               [facility]                    

 

 Phos                          Phosphorus                          5.2 mg/dL   
                        2.3 - 4.8                          10/07/2015          
                [facility]                    

 

 INR                          INR                          0.95                
                                      10/07/2015                          [facility]                    

 

 PT                          Protime                          13.3 second(s)   
                        11.3 - 15.6                          10/07/2015        
                  [facility]                    

 

 PTT                          PTT                          30.5 second(s)      
                     24.5 - 37.5                          10/07/2015           
               [facility]                    

 

 Alb                          Albumin                          3.3 gm/dL       
                    3.0 - 5.1                          10/07/2015              
            [facility]                    

 

 Phos                          Phosphorus                          5.5 mg/dL   
                        2.3 - 4.8                          10/07/2015          
                [facility]                    

 

 BasMet                          Sodium                          144 mmol/L    
                       135 - 145                          10/07/2015           
               [facility]                    

 

 BasMet                          Potassium                          4.2 mmol/L 
                          3.5 - 5.2                          10/07/2015        
                  [facility]                    

 

 BasMet                          Chloride                          108 mmol/L  
                         99 - 112                          10/07/2015          
                [facility]                    

 

 BasMet                          Carbon Dioxide                          26 mmol
/L                           20 - 30                          10/07/2015       
                   [facility]                    

 

 BasMet                          Anion Gap                          10 mmol/L  
                         7 - 14                          10/07/2015            
              [facility]                    

 

 BasMet                          Calcium                          8.7 mg/dL    
                       8.6 - 10.5                          10/07/2015          
                [facility]                    

 

 BasMet                          Glucose                          93 mg/dL     
                      65 - 110                          10/07/2015             
             [facility]                    

 

 BasMet                          BUN                          30 mg/dL         
                  5 - 20                          10/07/2015                   
       [facility]                    

 

 BasMet                          Creatinine                          3.59 mg/dL
                           .35 - 1.13                          10/07/2015      
                    [facility]                    

 

 BasMet                          Creatinine, Old Calibration                   
       3.9 mg/dL                           0.5 - 1.3                          10
/2015                          HI                          This creatinine 
value is a calculated value from the newly implemented IDMS calibration.  It 
represents the value equivalent to what was previously reported by the 
laboratory.

                          [facility]                
    

 

 C3                          C3                          109.0 mg/dL           
                86.0 - 184.0                          10/07/2015               
           [facility]                    

 

 C4                          C4                          19.7 mg/dL            
               10.0 - 40.0                          10/07/2015                 
         [facility]                    

 

 Creat U                          Creatinine Ur Random                          
45.2 mg/dL                                                     10/06/2015      
                    [facility]                    

 

 Prot U                          Protein Ur Random                          15 
mg/dL                                                     10/06/2015           
               [facility]                    

 

 Prot U                          Protein/Creatinine Ur Random                  
        0.33                                                      10/06/2015   
                       [facility]                    

 

 UA                          Color Ur                          COLORLESS       
                                               10/06/2015                      
    [facility]                    

 

 UA                          Clarity Ur                          CLEAR         
                                             10/06/2015                        
  [facility]                    

 

 UA                          Glucose Ur                          NEGATIVE      
                      NEGATIVE                          10/06/2015             
             [facility]                    

 

 UA                          Bili Ur                          NEGATIVE         
                   NEGATIVE                          10/06/2015                
          [facility]                    

 

 UA                          Ketones Ur                          NEGATIVE      
                      NEGATIVE                          10/06/2015             
             [facility]                    

 

 UA                          Specific Gravity  Ur                          
1.004                            1.005 - 1.035                          10/06/
2015                          LOW                                              
      [facility]                    

 

 UA                          pH Ur                          5.5                
            4.6 - 8.0                          10/06/2015                      
    [facility]                    

 

 UA                          Protein Ur                          NEGATIVE      
                      NEGATIVE                          10/06/2015             
             [facility]                    

 

 UA                          Nitrite Ur                          NEGATIVE      
                      NEGATIVE                          10/06/2015             
             [facility]                    

 

 UA                          Blood Ur                          NEGATIVE        
                    NEGATIVE                          10/06/2015               
           [facility]                    

 

 UA                          Leukocytes Ur                          NEGATIVE   
                         NEGATIVE                          10/06/2015          
                [facility]                    

 

 UA                          Urobilinogen Ur                          NORMAL mg/
dL                           0.2 - 2.0                          10/06/2015     
                     [facility]                    

 

 CK                          CK                          49 unit/L             
              55 - 370                          10/06/2015                     
     LOW                                                    [facility]                    

 

 Creat U                          Creatinine Ur Random                          
43.6 mg/dL                                                     10/06/2015      
                    [facility]                    

 

 BasMet                          Sodium                          139 mmol/L    
                       135 - 145                          10/06/2015           
               [facility]                    

 

 BasMet                          Potassium                          4.5 mmol/L 
                          3.5 - 5.2                          10/06/2015        
                  [facility]                    

 

 BasMet                          Chloride                          107 mmol/L  
                         99 - 112                          10/06/2015          
                [facility]                    

 

 BasMet                          Carbon Dioxide                          23 mmol
/L                           20 - 30                          10/06/2015       
                   [facility]                    

 

 BasMet                          Anion Gap                          9 mmol/L   
                        7 - 14                          10/06/2015             
             [facility]                    

 

 BasMet                          Calcium                          9.0 mg/dL    
                       8.6 - 10.5                          10/06/2015          
                [facility]                    

 

 BasMet                          Glucose                          84 mg/dL     
                      65 - 110                          10/06/2015             
             [facility]                    

 

 BasMet                          BUN                          27 mg/dL         
                  5 - 20                          10/06/2015                   
       [facility]                    

 

 BasMet                          Creatinine                          4.01 mg/dL
                           .35 - 1.13                          10/06/2015      
                    [facility]                    

 

 BasMet                          Creatinine, Old Calibration                   
       4.3 mg/dL                           0.5 - 1.3                          10
/2015                          HI                          This creatinine 
value is a calculated value from the newly implemented IDMS calibration.  It 
represents the value equivalent to what was previously reported by the 
laboratory.

                          [facility]                
    

 

 LDH                          LDH                          387 unit/L          
                 370 - 645                          10/06/2015                 
         [facility]                    

 

 Uric                          Uric Acid                          7.5 mg/dL    
                       3.0 - 8.0                          10/06/2015           
               [facility]                    



                                                                               
                                                                               
                                                                               
                                                                               
                                                                               
                                                                               
                                                                               
                                                                               
                                                                               
                                                                               
                                                                               
                                                                               
                                                                               
                                                                               
                                                                               
                                                                               
                                                                               
                                                                               
                                                                               
                                                                               
                                                                               
                                                                               
                                                                               
                                                                               
                                                                               
                                                                               
                                                                               
                                                                               
                                                                               
                                                                               
                                                                               
                                                                               
                                                                               
                                                                               
                                                                               
                                                                               
                                                                               
                                                                               
                                                                               
                                                                               
                                                                               
                                                                               
                                                                               
                                                                               
                                                                               
                                                                               
                                                                               
                                                                               
                                                                               
                                                                               
                                                                               
                                                                               
                                                                               
                                                                               
                                                                               
                                                                           



Vital Signs

                                    





 Vital Sign                          Value                          Date       
                   Comments                          Source                    

 

 Height/Length                          178.4 cm                          2016                                                    [facility]                    

 

 Systolic Blood Pressure Cuff Monitored                          <content ID='
[id number]'>119</content>/<content ID='[id number]'>66</content> mm[Hg
]                          2016                                          
          [facility]                    

 

 Heart Rate                          62 bpm                          2016
                                                    [facility]                    

 

 Current Weight                          75.1 kg                          2016                                                    [facility]                    

 

 Systolic Blood Pressure Cuff Monitored                          <content ID='
[id number]'>122</content>/<content ID='[id number]'>70</content> mm[Hg
]                          2016                                          
          [facility]                    

 

 Heart Rate                          75 bpm                          2016
                                                    [facility]                    

 

 Height/Length                          177.5 cm                          2016                                                    [facility]                    

 

 Current Weight                          73.4 kg                          2016                                                    [facility]                    

 

 Height/Length                          177.9 cm                          10/26/
2015                                                    [facility]                    

 

 Systolic Blood Pressure Cuff Monitored                          <content ID='
[id number]'>125</content>/<content ID='[id number]'>72</content> mm[Hg
]                          10/26/2015                                          
          [facility]                    

 

 Heart Rate                          64 bpm                          10/26/2015
                                                    [facility]                    

 

 Current Weight                          71.8 kg                          10/26/
2015                                                    [facility]                    

 

 Height/Length                          171.3 cm                          10/15/
2015                                                    [facility]                    

 

 Temperature Route                          Oral <br/>(10/15/2015 13:52:00) <sup
> </sup>                           10/15/2015                                  
                  [facility]                    

 

 Current Weight                          69.1 kg                          10/15/
2015                                                    [facility]                    

 

 Systolic Blood Pressure Cuff Monitored                          <content ID='
[id number]'>120</content>/<content ID='[id number]'>83</content> mm[Hg
]                          10/15/2015                                          
          [facility]                    

 

 Respiratory Rate                          20 BR/min                          10
/15/2015                                                    [facility]                    

 

 Heart Rate                          56 bpm                          10/15/2015
                                                    [facility]                    

 

 Temperature Celsius                          35.9 Casandra                          
10/15/2015                                                    [facility]                    

 

 Temperature Celsius                          36.8 Casandra                          
10/10/2015                                                    [facility]                    

 

 Temperature Route                          Oral <br/>(10/10/2015 08:00:00) <sup
> </sup>                           10/10/2015                                  
                  [facility]                    

 

 Systolic Blood Pressure Cuff Monitored                          <content ID='
[id number]'>122</content>/<content ID='[id number]'>78</content> mm[Hg
]                          10/10/2015                                          
          [facility]                    

 

 Heart Rate                          56 bpm                          10/10/2015
                                                    [facility]                    

 

 Respiratory Rate                          16 BR/min                          10
/10/2015                                                    [facility]                    

 

 Heart Rate                          62 bpm                          10/10/2015
                                                    [facility]                    

 

 Respiratory Rate                          18 BR/min                          10
/10/2015                                                    [facility]                    

 

 Respiratory Rate                          18 BR/min                          10
/10/2015                                                    [facility]                    

 

 Temperature Route                          Oral <br/>(10/10/2015 04:00:00) <sup
> </sup>                           10/10/2015                                  
                  [facility]                    

 

 Temperature Celsius                          36.7 Casandra                          
10/10/2015                                                    [facility]                    

 

 Systolic Blood Pressure Cuff Monitored                          <content ID='
[id number]'>110</content>/<content ID='[id number]'>58</content> mm[Hg
]                          10/10/2015                                          
          [facility]                    

 

 Systolic Blood Pressure Cuff Monitored                          <content ID='
[id number]'>115</content>/<content ID='[id number]'>70</content> mm[Hg
]                          10/10/2015                                          
          [facility]                    

 

 Temperature Route                          Oral <br/>(10/10/2015 00:00:00) <sup
> </sup>                           10/10/2015                                  
                  [facility]                    

 

 Temperature Celsius                          36.7 Casandra                          
10/10/2015                                                    [facility]                    

 

 Current Weight                          70.4 kg                          10/10/
2015                                                    [facility]                    

 

 Current Weight                          72.8 kg                          10/09/
2015                                                    [facility]                    

 

 Current Weight                          73.1 kg                          10/08/
2015                                                    [facility]                    

 

 Respiratory Rate Monitored                          23 BR/min                 
         10/07/2015                                                    [facility]                    

 

 Heart Rate Monitored                          43 bpm                          
10/07/2015                                                    [facility]                    

 

 Heart Rate Monitored                          74 bpm                          
10/06/2015                                                    [facility]                    

 

 Height/Length                          179.5 cm                          10/06/
2015                                                    [facility]                    



                                                                               
                                                                               
                                                                               
                                                                               
                                                                               
                                                                               
                                                                               
                                                                               
                                                                                



Encounters

                    





 Location                          Location Details                          
Encounter Type                          Encounter Number                        
  Reason For Visit                          Attending Provider                 
         ADM Date                          DC Date                          
Status                          Source                    

 

 [facility] [facility]                          IN                  
        [id number]                                                    Lara Jollyrus                           10/05/2015                          10/10/2015
                          Active                          [facility]                          CLI                 
         [id number]                                                    Aliza Martins                           10/15/2015                          10/15/2015  
                        [facility]                          CLI                 
         [id number]                                                    Octaviano Fischer                           10/26/2015                          10/26/
2015                          Active                          [facility]                          REF                 
         [id number]                                                    Sid Granger                           2016                          Active                          [facility]                          CLI                 
         [id number]                                                    Octaviano Fischer                           2016                          [facility]                          REF                 
         [id number]                                                    Orly Kay                           2016                          [facility]                          CLI                 
         [id number]                                                    Octaviano Fischer                           2016                          [facility]                    



                                                                               
                                                                 



Procedures

                                                                



Plan of Care

                                                                



Social History

                                                                        



Assessment and Plan

                                                                



Family History

                                                                



Advance Directives

                                                                



Functional Status

## 2018-03-20 NOTE — DIAGNOSTIC IMAGING REPORT
INDICATION: Left hip pain



FINDINGS:  

The patient has a sclerotic lesion in the left ilium with

trabecular thickening consistent with Paget's disease. The left

femur and hip are unremarkable.



IMPRESSION:

Abnormal bone density in the left hemipelvis. This is unchanged

since CT dated 10/04/2015.



Dictated by: 



  Dictated on workstation # [id number]

## 2018-03-20 NOTE — XMS REPORT
Encounter CCD: 2016 to 2016

 Created on: 2097



Rich Miller

External Reference #: [id number]

: 2000

Sex: Male



Demographics







 Address  [address] [place]  [address]-

 

 Home Phone  [phone]

 

 Preferred Language  Unknown

 

 Marital Status  Single

 

 Uatsdin Affiliation  None

 

 Race  White

 

 Ethnic Group  Not  or 





Author







 Author  Auto Generated

 

 Organization  [facility]

 

 Address  Unknown

 

 Phone  Unavailable







Care Team Providers







 Care Team Member Name  Role  Phone

 

 Octaviano Villegas  CP  [phone]

 

 PrafulMeet sadlereddie MORSE  PP  [phone]







Allergies, Adverse Reactions, Alerts







  



  Substance   Reaction   Status

 

  



  No Known Adverse Reactions    Active







Problem List







  



  Condition   Effective Dates   Status

 

  



  Acute nontraumatic kidney injury    Active

 

  



  Acute renal failure   2016   Active







Medications







    



  Medication   Instructions   Start Date   End Date   Status

 

    



  influenza virus   10/15/15 15:21:00 CDT, Routine, 0.5   10/15/2015   10/15/
2015   Completed



  vaccine, inactivated   mL, IM, 1 time only, 1 dose(s),   



   Stop date 10/15/15 15:21:00 CDT   







Immunizations







  



  Vaccine   Date   Status

 

  



  Influenza Virus, Inactivated   10/15/2015   Auth (Verified)







Vital Signs







 Most recent to oldest [Reference Range]:  1

 

 Heart Rate [ bpm]  62 bpm 

 (2016 10:18:00)  









 Most recent to oldest [Reference Range]:  1

 

 Blood Pressure Cuff [/45-86 mmHg]  <content ID='[id number]'>119</
content>/<content ID='[id number]'>66</content> mmHg 

 (2016 10:18:00)  









 Most recent to oldest [Reference Range]:  1

 

 Current Weight  75.1 kg 

 (2016 10:18:00)  









 Most recent to oldest [Reference Range]:  1

 

 Height/Length  178.4 cm 

 (2016 10:18:00)

## 2018-03-20 NOTE — XMS REPORT
Encounter CCD: 2016 to 2016

 Created on: 2085



Rich Miller

External Reference #: [id number]

: 2000

Sex: Male



Demographics







 Address  [address] [place]  [address]-

 

 Home Phone  [phone]

 

 Preferred Language  Unknown

 

 Marital Status  Single

 

 Confucianist Affiliation  None

 

 Race  White

 

 Ethnic Group  Not  or 





Author







 Author  Auto Generated

 

 Organization  [facility]

 

 Address  Unknown

 

 Phone  Unavailable







Care Team Providers







 Care Team Member Name  Role  Phone

 

 Bárbara, Sid AVALOS  CP  [phone]

 

 Arianna Basilio  PP  [phone]







Allergies, Adverse Reactions, Alerts







  



  Substance   Reaction   Status

 

  



  No Known Adverse Reactions    Active







Problem List







  



  Condition   Effective Dates   Status

 

  



  Acute nontraumatic kidney injury    Active







Medications







    



  Medication   Instructions   Start Date   End Date   Status

 

    



  influenza virus   10/15/15 15:21:00 CDT, Routine, 0.5   10/15/2015   10/15/
2015   Completed



  vaccine, inactivated   mL, IM, 1 time only, 1 dose(s),   



   Stop date 10/15/15 15:21:00 CDT   







Immunizations







  



  Vaccine   Date   Status

 

  



  Influenza Virus, Inactivated   10/15/2015   Auth (Verified)

## 2018-03-20 NOTE — XMS REPORT
Encounter CCD: 10/26/2015 to 10/26/2015

 Created on: 10/24/2063



Rich Miller

External Reference #: [id number]

: 2000

Sex: Male



Demographics







 Address  [address]  [address]-

 

 Home Phone  [phone]

 

 Preferred Language  Unknown

 

 Marital Status  Single

 

 Confucianist Affiliation  None

 

 Race  White

 

 Ethnic Group  Not  or 





Author







 Author  Auto Generated

 

 Organization  [facility]

 

 Address  Unknown

 

 Phone  Unavailable







Care Team Providers







 Care Team Member Name  Role  Phone

 

 BakariOctaviano scherer  CP  [phone]

 

 No, Referring  RP  Unavailable

 

 Arianna Basilio  PP  [phone]







Allergies, Adverse Reactions, Alerts







  



  Substance   Reaction   Status

 

  



  No Known Adverse Reactions    Active







Problem List







  



  Condition   Effective Dates   Status

 

  



  Acute nontraumatic kidney injury    Active







Medications







    



  Medication   Instructions   Start Date   End Date   Status

 

    



  influenza virus   10/15/15 15:21:00 CDT, Routine, 0.5   10/15/2015   10/15/
2015   Completed



  vaccine, inactivated   mL, IM, 1 time only, 1 dose(s),   



   Stop date 10/15/15 15:21:00 CDT   







Immunizations







  



  Vaccine   Date   Status

 

  



  Influenza Virus, Inactivated   10/15/2015   Auth (Verified)







Vital Signs







 Most recent to oldest [Reference Range]:  1

 

 Heart Rate [ bpm]  64 bpm 

 (10/26/2015 09:01:00)  









 Most recent to oldest [Reference Range]:  1

 

 Blood Pressure Cuff [/45-86 mmHg]  <content ID='[id number]'>125</
content>/<content ID='[id number]'>72</content> mmHg 1

 (10/26/2015 09:01:00)  









 Most recent to oldest [Reference Range]:  1

 

 Current Weight  71.8 kg 

 (10/26/2015 09:01:00)  









 Most recent to oldest [Reference Range]:  1

 

 Height/Length  177.9 cm 

 (10/26/2015 09:01:00)  







1Result Comment: right arm adult cuff sitting awake

## 2018-03-20 NOTE — XMS REPORT
Continuity of Care Document

 Created on: 2018



BRIANDA LEON

External Reference #: [id number]

: 2000

Sex: Male



Demographics







 Address  [address] [place]  [address]

 

 Home Phone  [phone] x

 

 Preferred Language  Unknown

 

 Marital Status  Unknown

 

 Taoism Affiliation  Unknown

 

 Race  Unknown

 

 Ethnic Group  Unknown





Author







 Author  [facility]

 

 Organization  [facility]

 

 Address  Unknown

 

 Phone  Unavailable



              



Allergies

      





 Active            Description            Code            Type            
Severity            Reaction            Onset            Reported/Identified   
         Relationship to Patient            Clinical Status        

 

 Yes            No Known Drug Allergies            [id number]            Drug 
Allergy            Unknown            N/A                         10/04/2015   
                               



                  



Medications

      



There is no data.                  



Problems

      





 Date Dx Coded            Attending            Type            Code            
Diagnosis            Diagnosed By        

 

 10/05/2015            IHSAN CULVER, ERICK BASURTO            Ot            I88.0     
                             

 

 10/05/2015            ERICK CHAMPAGNE MD            Ot            M89.9     
                             

 

 10/05/2015            ERICK CHAMPAGNE MD            Ot            N28.9     
                             



                      



Procedures

      



There is no data.                  



Results

      



There is no data.              



Encounters

      





 ACCT No.            Visit Date/Time            Discharge            Status    
        Pt. Type            Provider            Facility            Loc./Unit  
          Complaint        

 

 [id number]            10/04/2015 19:50:00            10/05/2015 19:00:00    
        DIS            Inpatient            ERICK CHAMPAGNE MD            [facility]            SURGICAL

## 2018-03-20 NOTE — ED LOWER EXTREMITY
General


Stated Complaint:  L HIP PAIN


Source:  patient


Exam Limitations:  no limitations





History of Present Illness


Date Seen by Provider:  Mar 20, 2018


Time Seen by Provider:  15:43


Initial Comments


To ER ambulatory with reports of left hip pain worse since last Wednesday 3/14. 

It worsened even more last night. He works at [email or web address] and is on his feet a 

lot and throughout the day at work his hip pain worsens. He has a history of a 

cyst on the left hip that was found here. He states he was admitted here for 

kidney failure about 2 years ago. He had removal of the cyst on the [place] hip in 

[place] and was told it was not cancer but does not know what kind of 

systems this was. Ever since that surgery he has intermittent pain to the left 

hip worse with weather changes. It has been raining the past few days and he 

attributes his increased pain in the past few days to the weather change. He 

took Tylenol last night at work without relief. He does not have a primary care 

provider.


Onset:  this evening


Severity:  moderate


Pain/Injury Location:  left hip


Modifying Factors:  Worse With Movement





Allergies and Home Medications


Allergies


Coded Allergies:  


     No Known Drug Allergies (Unverified , 10/4/15)





Home Medications


No Active Prescriptions or Reported Meds





Patient Home Medication List


Home Medication List Reviewed:  Yes





Constitutional:  see HPI


EENTM:  see HPI


Respiratory:  no symptoms reported


Cardiovascular:  no symptoms reported


Genitourinary:  no symptoms reported


Musculoskeletal:  see HPI


Skin:  no symptoms reported


Psychiatric/Neurological:  No Symptoms Reported





Past Medical-Social-Family Hx


Patient Social History


Recent Foreign Travel:  No


Contact w/Someone Who Travel:  No





Immunizations Up To Date


PED Vaccines UTD:  Yes





Seasonal Allergies


Seasonal Allergies:  No





Respiratory


Currently Using CPAP:  No


Currently Using BIPAP:  No





Reproductive System


Hx Reproductive Disorders:  No


Sexually Transmitted Disease:  No


HIV/AIDS:  No





Blood Transfusions


Adverse Reaction to a Blood Tr:  No





Family Medical History


Significant Family History:  No Pertinent Family Hx


Family Medial History:  


Arthritis


  17 FATHER





Physical Exam


Vital Signs





Vital Signs - First Documented








 3/20/18





 15:35


 


Temp 98.3


 


Pulse 85


 


Resp 18


 


B/P (MAP) 114/71


 


O2 Delivery Room Air





Capillary Refill :


General Appearance:  WD/WN, no apparent distress


HEENT:  PERRL/EOMI, normal ENT inspection


Neck:  non-tender, full range of motion


Respiratory:  no respiratory distress, no accessory muscle use


Gastrointestinal:  normal bowel sounds, non tender


Hips:  bilateral hip non-tender, bilateral hip normal inspection, bilateral hip 

normal range of motion, left hip other (right now he has no pain)


Legs:  bilateral leg non-tender, bilateral leg normal inspection, bilateral leg 

normal range of motion


Knees:  bilateral knee non-tender, bilateral knee normal inspection, bilateral 

knee normal range of motion


Ankles:  bilateral ankle non-tender, bilateral ankle normal inspection, 

bilateral ankle normal range of motion


Feet:  bilateral foot non-tender, bilateral foot normal inspection, bilateral 

foot normal range of motion


Neurologic/Psychiatric:  alert, normal mood/affect, oriented x 3


Skin:  normal color, warm/dry





Progress/Results/Core Measures


Results/Orders


Lab Results





Laboratory Tests








Test


  3/20/18


15:55 Range/Units


 


 


White Blood Count


  6.2 


  4.3-11.0


10^3/uL


 


Red Blood Count


  4.92 


  4.35-5.85


10^6/uL


 


Hemoglobin 15.7  13.3-17.7  G/DL


 


Hematocrit 46  40-54  %


 


Mean Corpuscular Volume 94  80-99  FL


 


Mean Corpuscular Hemoglobin 32  25-34  PG


 


Mean Corpuscular Hemoglobin


Concent 34 


  32-36  G/DL


 


 


Red Cell Distribution Width 12.0  10.0-14.5  %


 


Platelet Count


  236 


  130-400


10^3/uL


 


Mean Platelet Volume 10.4  7.4-10.4  FL


 


Neutrophils (%) (Auto) 56  42-75  %


 


Lymphocytes (%) (Auto) 34  12-44  %


 


Monocytes (%) (Auto) 8  0-12  %


 


Eosinophils (%) (Auto) 1  0-10  %


 


Basophils (%) (Auto) 1  0-10  %


 


Neutrophils # (Auto) 3.5  1.8-7.8  X 10^3


 


Lymphocytes # (Auto) 2.1  1.0-4.0  X 10^3


 


Monocytes # (Auto) 0.5  0.0-1.0  X 10^3


 


Eosinophils # (Auto)


  0.1 


  0.0-0.3


10^3/uL


 


Basophils # (Auto)


  0.0 


  0.0-0.1


10^3/uL


 


Sodium Level 138  135-145  MMOL/L


 


Potassium Level 3.9  3.6-5.0  MMOL/L


 


Chloride Level 105    MMOL/L


 


Carbon Dioxide Level 25  21-32  MMOL/L


 


Anion Gap 8  5-14  MMOL/L


 


Blood Urea Nitrogen 14  7-18  MG/DL


 


Creatinine


  0.86 


  0.60-1.30


MG/DL


 


Estimat Glomerular Filtration


Rate > 60 


   


 


 


BUN/Creatinine Ratio 16   


 


Glucose Level 85    MG/DL


 


Calcium Level 9.5  8.5-10.1  MG/DL


 


C-Reactive Protein High


Sensitivity 0.03 


  0.00-0.50


MG/DL








My Orders





Orders - EDWIN MÁRQUEZ


Cbc With Automated Diff (3/20/18 15:42)


Hs C Reactive Protein (3/20/18 15:42)


Hip, Left, 2 Views (3/20/18 15:42)


Basic Metabolic Panel (3/20/18 15:42)





Vital Signs/I&O





Vital Sign - Last 12Hours








 3/20/18





 15:35


 


Temp 98.3


 


Pulse 85


 


Resp 18


 


B/P (MAP) 114/71


 


O2 Delivery Room Air











Diagnostic Imaging





   Diagonstic Imaging:  Xray


Comments


NAME:   BRIANDA LEON


MED REC#:   [id number]


ACCOUNT#:   [id number]


PT STATUS:   REG ER


:   2000


PHYSICIAN:   EDWIN MÁRQUEZ


ADMIT DATE:   18/ER


 ***Draft***


Date of Exam:18





HIP, LEFT, 2 VIEWS








INDICATION: Left hip pain





FINDINGS:  


The patient has a sclerotic lesion in the left ilium with


trabecular thickening consistent with Paget's disease. The left


femur and hip are unremarkable.





IMPRESSION:


Abnormal bone density in the left hemipelvis. This is unchanged


since CT dated 10/04/2015.





  Dictated on workstation # [id number]








Dict:   18 1611


Trans:   18 1619


[facility] [id number]





Interpreted by:     BERNICE SEBASTIAN MD


Electronically signed by:





Departure


Impression


Impression:  


 Primary Impression:  


 Left hip pain


Disposition:  01 HOME, SELF-CARE


Condition:  Stable





Departure-Patient Inst.


Decision time for Depature:  16:31


Referrals:  


NO,LOCAL PHYSICIAN (PCP/Family)


Primary Care Physician


Patient Instructions:  Hip Pain





Add. Discharge Instructions:  


1. Pain medication as directed. Return to ER for any concerns. You need to 

follow-up with a primary care provider so that they can follow your kidney 

function. However, today it looks fine.


Scripts


Tramadol HCl (Ultram) 50 Mg Tablet


50 MG PO Q6H Y for PAIN-MODERATE TO SEVERE, #10 TAB


   Prov: EDWIN MÁRQUEZ         3/20/18 


Naproxen Sodium (Anaprox Ds) 550 Mg Tablet


550 MG PO BID Y for PAIN-MODERATE, #20 TAB


   Prov: EDWIN MÁRQUEZ         3/20/18


Work/School Note:  Work Release Form   Date Seen in the Emergency Department:  

Mar 20, 2018


   Return to Work:  Mar 22, 2018











EDWIN MÁRQUEZ Mar 20, 2018 15:46

## 2018-03-20 NOTE — XMS REPORT
Encounter CCD: 2016 to 2016

 Created on: 2036



Rich Miller

External Reference #: [id number]

: 2000

Sex: Male



Demographics







 Address  [address] [place]  [address]-

 

 Home Phone  [phone]

 

 Preferred Language  Unknown

 

 Marital Status  Single

 

 Presybeterian Affiliation  None

 

 Race  White

 

 Ethnic Group  Not  or 





Author







 Author  Auto Generated

 

 Organization  [facility]

 

 Address  Unknown

 

 Phone  Unavailable







Care Team Providers







 Care Team Member Name  Role  Phone

 

 KayOrly  CP  [phone]

 

 Forrest Fernandes  RP  [phone]

 

 Arianna Basilio  PP  [phone]







Allergies, Adverse Reactions, Alerts







  



  Substance   Reaction   Status

 

  



  No Known Adverse Reactions    Active







Problem List







  



  Condition   Effective Dates   Status

 

  



  Acute nontraumatic kidney injury    Active







Medications







    



  Medication   Instructions   Start Date   End Date   Status

 

    



  influenza virus   10/15/15 15:21:00 CDT, Routine, 0.5   10/15/2015   10/15/
2015   Completed



  vaccine, inactivated   mL, IM, 1 time only, 1 dose(s),   



   Stop date 10/15/15 15:21:00 CDT   







Immunizations







  



  Vaccine   Date   Status

 

  



  Influenza Virus, Inactivated   10/15/2015   Auth (Verified)

## 2018-03-20 NOTE — XMS REPORT
Encounter CCD: 10/05/2015 to 10/10/2015

 Created on: 2062



Rich Miller

External Reference #: [id number]

: 2000

Sex: Male



Demographics







 Address  [address] [place]  [address]

 

 Home Phone  [phone]

 

 Preferred Language  Unknown

 

 Marital Status  Single

 

 Anabaptism Affiliation  None

 

 Race  White

 

 Ethnic Group  Not  or 





Author







 Author  Auto Generated

 

 Organization  [facility]

 

 Address  Unknown

 

 Phone  Unavailable







Care Team Providers







 Care Team Member Name  Role  Phone

 

 No, Referring  RP  Unavailable

 

 No, PCP  PP  Unavailable

 

 Lara Woods  CP  [phone]

 

 Pako Rehmanananyarakesh  CP  [phone]







Allergies, Adverse Reactions, Alerts







  



  Substance   Reaction   Status

 

  



  No Known Adverse Reactions    Active







Problem List







  



  Condition   Effective Dates   Status

 

  



  No Chronic Problems    Active







Vital Signs







 Most recent to oldest [Reference Range]:  1  2  3

 

 Heart Rate [ bpm]  56 bpm 

 (10/10/2015 08:00:00)    62 bpm 

 (10/10/2015 06:00:00)    57 bpm 

 (10/10/2015 06:00:00)  









 Most recent to oldest [Reference Range]:  1  2  3

 

 Heart Rate Monitored [ bpm]  43 bpm 

*<LLOW*

 (10/07/2015 09:00:00)    74 bpm 

 (10/06/2015 08:00:00)     









 Most recent to oldest [Reference Range]:  1  2  3

 

 Respiratory Rate [10-40 BR/min]  16 BR/min 

 (10/10/2015 08:00:00)    18 BR/min 

 (10/10/2015 06:00:00)    18 BR/min 

 (10/10/2015 04:00:00)  









 Most recent to oldest [Reference Range]:  1  2  3

 

 Respiratory Rate Monitored [10-40 BR/min]  23 BR/min 

 (10/07/2015 09:00:00)        









 Most recent to oldest [Reference Range]:  1  2  3

 

 Blood Pressure Cuff [/45-83 mmHg]  <content ID='[id number]'>122</
content>/<content ID='[id number]'>78</content> mmHg 

 (10/10/2015 08:00:00)    <content ID='[id number]'>110</content>/<content 
ID='[id number]'>58</content> mmHg 

 (10/10/2015 04:00:00)    <content ID='[id number]'>115</content>/<content 
ID='[id number]'>70</content> mmHg 

 (10/10/2015 00:00:00)  









 Most recent to oldest [Reference Range]:  1  2  3

 

 Temperature Route  Oral 

 (10/10/2015 08:00:00)    Oral 

 (10/10/2015 04:00:00)    Oral 

 (10/10/2015 00:00:00)  









 Most recent to oldest [Reference Range]:  1  2  3

 

 Temperature Celsius [36-38.4 DegC]  36.8 DegC 

 (10/10/2015 08:00:00)    36.7 DegC 

 (10/10/2015 04:00:00)    36.7 DegC 

 (10/10/2015 00:00:00)  









 Most recent to oldest [Reference Range]:  1  2  3

 

 Current Weight  70.4 kg 

 (10/09/2015 20:00:00)    72.8 kg 

 (10/08/2015 20:44:00)    73.1 kg 

 (10/07/2015 21:03:00)  









 Most recent to oldest [Reference Range]:  1  2  3

 

 Height/Length  179.5 cm 

 (10/05/2015 22:59:00)

## 2018-03-20 NOTE — XMS REPORT
Encounter CCD: 2016 to 2017

 Created on: 2084



Rich Miller

External Reference #: [id number]

: 2000

Sex: Male



Demographics







 Address  [address] [place] [address]-

 

 Home Phone  [phone]

 

 Preferred Language  Unknown

 

 Marital Status  Single

 

 Advent Affiliation  None

 

 Race  White

 

 Ethnic Group  Not  or 





Author







 Author  [employer]

 

 Organization  [facility]

 

 Address  Unknown

 

 Phone  Unavailable







Care Team Providers







 Care Team Member Name  Role  Phone

 

 Forrest Fernandes  RP  [phone]

 

 Provider, Unknown  CP  [phone]

 

 Arianna Basilio  PP  [phone]







Allergies, Adverse Reactions, Alerts







  



  Substance   Reaction   Status

 

  



  No Known Adverse Reactions    Active







Problem List







  



  Condition   Effective Dates   Status

 

  



  Acute nontraumatic kidney injury    Active

 

  



  Acute renal failure   2016   Active







Medications







    



  Medication   Instructions   Start Date   End Date   Status

 

    



  influenza virus   10/15/15 15:21:00 CDT, Routine, 0.5   10/15/2015   10/15/
2015   Completed



  vaccine, inactivated   mL, IM, 1 time only, 1 dose(s),   



   Stop date 10/15/15 15:21:00 CDT   







Immunizations







   



  Vaccine   Date   Status   Refusal Reason

 

   



  Influenza Virus, Inactivated   10/15/2015   Given

## 2018-03-20 NOTE — XMS REPORT
Encounter CCD: 2016 to 2016

 Created on: 2102



Rich Miller

External Reference #: [id number]

: 2000

Sex: Male



Demographics







 Address  [address] [place]  [address]-

 

 Home Phone  [phone]

 

 Preferred Language  Unknown

 

 Marital Status  Single

 

 Anglican Affiliation  None

 

 Race  White

 

 Ethnic Group  Not  or 





Author







 Author  Auto Generated

 

 Organization  [facility]

 

 Address  Unknown

 

 Phone  Unavailable







Care Team Providers







 Care Team Member Name  Role  Phone

 

 Octaviano Villegas  CP  [phone]

 

 Arianna Basilio  PP  [phone]







Allergies, Adverse Reactions, Alerts







  



  Substance   Reaction   Status

 

  



  No Known Adverse Reactions    Active







Problem List







  



  Condition   Effective Dates   Status

 

  



  Acute nontraumatic kidney injury    Active







Medications







    



  Medication   Instructions   Start Date   End Date   Status

 

    



  influenza virus   10/15/15 15:21:00 CDT, Routine, 0.5   10/15/2015   10/15/
2015   Completed



  vaccine, inactivated   mL, IM, 1 time only, 1 dose(s),   



   Stop date 10/15/15 15:21:00 CDT   







Immunizations







  



  Vaccine   Date   Status

 

  



  Influenza Virus, Inactivated   10/15/2015   Auth (Verified)







Vital Signs







 Most recent to oldest [Reference Range]:  1

 

 Heart Rate [ bpm]  75 bpm 

 (2016 09:06:00)  









 Most recent to oldest [Reference Range]:  1

 

 Blood Pressure Cuff [/45-86 mmHg]  <content ID='[id number]'>122</
content>/<content ID='[id number]'>70</content> mmHg 

 (2016 09:06:00)  









 Most recent to oldest [Reference Range]:  1

 

 Current Weight  73.4 kg 

 (2016 09:06:00)  









 Most recent to oldest [Reference Range]:  1

 

 Height/Length  177.5 cm 

 (2016 09:06:00)

## 2018-03-20 NOTE — XMS REPORT
Encounter CCD: 2017 to 2017

 Created on: 2046



Rich Miller

External Reference #: [id number]

: 2000

Sex: Male



Demographics







 Address  [address] [place], [place] [address]-

 

 Home Phone  [phone]

 

 Preferred Language  Unknown

 

 Marital Status  Single

 

 Hinduism Affiliation  None

 

 Race  White

 

 Ethnic Group  Not  or 





Author







 Author  Auto Generated

 

 Organization  [facility]

 

 Address  Unknown

 

 Phone  Unavailable







Care Team Providers







 Care Team Member Name  Role  Phone

 

 Provider, Unknown  CP  [phone]

 

 Arianna Basilio  PP  [phone]







Allergies, Adverse Reactions, Alerts







  



  Substance   Reaction   Status

 

  



  No Known Adverse Reactions    Active







Problem List







  



  Condition   Effective Dates   Status

 

  



  Acute nontraumatic kidney injury    Active

 

  



  Acute renal failure   2016   Active







Medications







    



  Medication   Instructions   Start Date   End Date   Status

 

    



  influenza virus   10/15/15 15:21:00 CDT, Routine, 0.5   10/15/2015   10/15/
2015   Completed



  vaccine, inactivated   mL, IM, 1 time only, 1 dose(s),   



   Stop date 10/15/15 15:21:00 CDT   







Immunizations







   



  Vaccine   Date   Status   Refusal Reason

 

   



  Influenza Virus, Inactivated   10/15/2015   Given

## 2018-03-20 NOTE — XMS REPORT
Encounter CCD: 10/15/2015 to 10/15/2015

 Created on: 2036



Rich Miller

External Reference #: [id number]

: 2000

Sex: Male



Demographics







 Address  [address]

[place]  [address]

 

 Home Phone  [phone]

 

 Preferred Language  Unknown

 

 Marital Status  Single

 

 Anabaptist Affiliation  None

 

 Race  White

 

 Ethnic Group  Not  or 





Author







 Author  Auto Generated

 

 Organization  [facility]

 

 Address  Unknown

 

 Phone  Unavailable







Care Team Providers







 Care Team Member Name  Role  Phone

 

 Aliza Martins  CP  [phone]

 

 P[facility], [facility]  PP  [phone]







Allergies, Adverse Reactions, Alerts







  



  Substance   Reaction   Status

 

  



  No Known Adverse Reactions    Active







Problem List







  



  Condition   Effective Dates   Status

 

  



  Acute nontraumatic kidney injury    Active







Medications







    



  Medication   Instructions   Start Date   End Date   Status

 

    



  influenza virus   10/15/15 15:21:00 CDT, Routine, 0.5   10/15/2015   10/15/
2015   Completed



  vaccine, inactivated   mL, IM, 1 time only, 1 dose(s),   



   Stop date 10/15/15 15:21:00 CDT   







Immunizations







  



  Vaccine   Date   Status

 

  



  Influenza Virus, Inactivated   10/15/2015   Auth (Verified)







Vital Signs







 Most recent to oldest [Reference Range]:  1

 

 Heart Rate [ bpm]  56 bpm 

 (10/15/2015 13:52:00)  









 Most recent to oldest [Reference Range]:  1

 

 Respiratory Rate [10-40 BR/min]  20 BR/min 

 (10/15/2015 13:52:00)  









 Most recent to oldest [Reference Range]:  1

 

 Blood Pressure Cuff [/45-86 mmHg]  <content ID='[id number]'>120</
content>/<content ID='[id number]'>83</content> mmHg 

 (10/15/2015 13:52:00)  









 Most recent to oldest [Reference Range]:  1

 

 Temperature Route  Oral 

 (10/15/2015 13:52:00)  









 Most recent to oldest [Reference Range]:  1

 

 Temperature Celsius [36.0-38.4 DegC]  35.9 DegC 

*LOW*

 (10/15/2015 13:52:00)  









 Most recent to oldest [Reference Range]:  1

 

 Current Weight  69.1 kg 

 (10/15/2015 13:52:00)  









 Most recent to oldest [Reference Range]:  1

 

 Height/Length  171.3 cm 

 (10/15/2015 13:52:00)  







Procedures







  



  Procedures   Date   Related Diagnosis

 

  



  None

## 2018-03-20 NOTE — XMS REPORT
[facility]

 Created on: 2017



Rich Miller

External Reference #: [id number]

: 2000

Sex: Male



Demographics







 Address  [address] [place]  [address]

 

 Preferred Language  Unknown

 

 Marital Status  Unknown

 

 Scientology Affiliation  Unknown

 

 Race  Unknown

 

 Ethnic Group  Unknown





Author







 Author  DOMINGA DOMINGUEZ

 

 Organization  [facility]

 

 Address  [address] [place]  [address]



 

 Phone  [phone]







Care Team Providers







 Care Team Member Name  Role  Phone

 

 DOMINGA DOMINGUEZ  Unavailable  [phone]







PROBLEMS

Unknown Problems



ALLERGIES







 Substance  Reaction  Event Type  Date  Status

 

 N.K.D.A.  Unknown  Non Drug Allergy    Unknown







SOCIAL HISTORY

No smoking Hx information available



PLAN OF CARE







 Activity  Details









  









 Follow Up  prn Reason:







VITAL SIGNS







 Weight  160.6 lbs  2017

 

 Temperature  98.1 degrees Fahrenheit  2017

 

 Heart Rate  96 bpm  2017

 

 Respiratory Rate  20   2017

 

 Blood pressure systolic  120 mmHg  2017

 

 Blood pressure diastolic  82 mmHg  2017







MEDICATIONS

No Known Medications



RESULTS







 Name  Result  Date  Reference Range

 

 STREP A (IN HOUSE)     2017   

 

 STREP A  positive      

 

 Control  +      

 

 Lot #  [id number]      

 

 Exp date        







PROCEDURES







 Procedure  Date Ordered  Related Diagnosis  Body Site

 

 STREP A ASSAY W/OPTIC  2017      

 

 Office Visit, New Pt., Level 3  2017      

 

 THER/PROPH/DIAG INJ, SC/IM  2017      

 

 BICILLIN LA/PENICILLIN G BENZATHINE  2017      







IMMUNIZATIONS







 Vaccine  Route  Administration Date  Status

 

 BICILLIN LA/PENICILLIN G BENZATHINE  IM Intramuscular  2017  
Administered

## 2019-11-08 ENCOUNTER — HOSPITAL ENCOUNTER (EMERGENCY)
Dept: HOSPITAL 63 - ER | Age: 19
Discharge: HOME | End: 2019-11-08
Payer: COMMERCIAL

## 2019-11-08 VITALS — BODY MASS INDEX: 25.41 KG/M2 | WEIGHT: 177.47 LBS | HEIGHT: 70 IN

## 2019-11-08 VITALS — DIASTOLIC BLOOD PRESSURE: 74 MMHG | SYSTOLIC BLOOD PRESSURE: 126 MMHG

## 2019-11-08 DIAGNOSIS — J20.9: Primary | ICD-10-CM

## 2019-11-08 DIAGNOSIS — Z87.891: ICD-10-CM

## 2019-11-08 LAB
ALBUMIN SERPL-MCNC: 4.5 G/DL (ref 3.4–5)
ALBUMIN/GLOB SERPL: 1.2 {RATIO} (ref 1–1.7)
ALP SERPL-CCNC: 89 U/L (ref 46–116)
ALT SERPL-CCNC: 44 U/L (ref 16–63)
ANION GAP SERPL CALC-SCNC: 10 MMOL/L (ref 6–14)
AST SERPL-CCNC: 23 U/L (ref 15–37)
BASOPHILS # BLD AUTO: 0 X10^3/UL (ref 0–0.2)
BASOPHILS NFR BLD: 0 % (ref 0–3)
BILIRUB SERPL-MCNC: 0.6 MG/DL (ref 0.2–1)
BUN/CREAT SERPL: 14 (ref 6–20)
CA-I SERPL ISE-MCNC: 14 MG/DL (ref 8–26)
CALCIUM SERPL-MCNC: 9.3 MG/DL (ref 8.5–10.1)
CHLORIDE SERPL-SCNC: 102 MMOL/L (ref 98–107)
CO2 SERPL-SCNC: 30 MMOL/L (ref 21–32)
CREAT SERPL-MCNC: 1 MG/DL (ref 0.7–1.3)
EOSINOPHIL NFR BLD: 0 % (ref 0–3)
EOSINOPHIL NFR BLD: 0 X10^3/UL (ref 0–0.7)
ERYTHROCYTE [DISTWIDTH] IN BLOOD BY AUTOMATED COUNT: 12.9 % (ref 11.5–14.5)
GFR SERPLBLD BASED ON 1.73 SQ M-ARVRAT: 94.3 ML/MIN
GLOBULIN SER-MCNC: 3.7 G/DL (ref 2.2–3.8)
GLUCOSE SERPL-MCNC: 87 MG/DL (ref 70–99)
HCT VFR BLD CALC: 46.6 % (ref 39–53)
HGB BLD-MCNC: 15.4 G/DL (ref 13–17.5)
LYMPHOCYTES # BLD: 2.1 X10^3/UL (ref 1–4.8)
LYMPHOCYTES NFR BLD AUTO: 21 % (ref 24–48)
MAGNESIUM SERPL-MCNC: 2.1 MG/DL (ref 1.8–2.4)
MCH RBC QN AUTO: 32 PG (ref 25–35)
MCHC RBC AUTO-ENTMCNC: 33 G/DL (ref 31–37)
MCV RBC AUTO: 96 FL (ref 79–100)
MONO #: 0.5 X10^3/UL (ref 0–1.1)
MONOCYTES NFR BLD: 5 % (ref 0–9)
NEUT #: 7.1 X10^3UL (ref 1.8–7.7)
NEUTROPHILS NFR BLD AUTO: 73 % (ref 31–73)
PLATELET # BLD AUTO: 248 X10^3/UL (ref 140–400)
POTASSIUM SERPL-SCNC: 3.8 MMOL/L (ref 3.5–5.1)
PROT SERPL-MCNC: 8.2 G/DL (ref 6.4–8.2)
RBC # BLD AUTO: 4.86 X10^6/UL (ref 4.3–5.7)
SODIUM SERPL-SCNC: 142 MMOL/L (ref 136–145)
WBC # BLD AUTO: 9.8 X10^3/UL (ref 4–11)

## 2019-11-08 PROCEDURE — 71046 X-RAY EXAM CHEST 2 VIEWS: CPT

## 2019-11-08 PROCEDURE — 94640 AIRWAY INHALATION TREATMENT: CPT

## 2019-11-08 PROCEDURE — 36415 COLL VENOUS BLD VENIPUNCTURE: CPT

## 2019-11-08 PROCEDURE — 85379 FIBRIN DEGRADATION QUANT: CPT

## 2019-11-08 PROCEDURE — 83880 ASSAY OF NATRIURETIC PEPTIDE: CPT

## 2019-11-08 PROCEDURE — 85025 COMPLETE CBC W/AUTO DIFF WBC: CPT

## 2019-11-08 PROCEDURE — 93005 ELECTROCARDIOGRAM TRACING: CPT

## 2019-11-08 PROCEDURE — 80053 COMPREHEN METABOLIC PANEL: CPT

## 2019-11-08 PROCEDURE — 83735 ASSAY OF MAGNESIUM: CPT

## 2019-11-08 PROCEDURE — 84484 ASSAY OF TROPONIN QUANT: CPT

## 2019-11-08 PROCEDURE — 99285 EMERGENCY DEPT VISIT HI MDM: CPT

## 2019-11-08 NOTE — EKG
[facility] [address], [place] [address]

Test Date:    2019               Test Time:    18:07:56

Pat Name:     VENTURA MEJIA             Department:   

Patient ID:   [id number]           Room:          

Gender:       M                        Technician:   MARQUES

:          2000               Requested By: ADARSH FIGUEROA

Order Number: [id number]            Reading MD:     

                                 Measurements

Intervals                              Axis          

Rate:         81                       P:            36

LA:           186                      QRS:          72

QRSD:         94                       T:            42

QT:           332                                    

QTc:          386                                    

                           Interpretive Statements

SINUS RHYTHM

INCOMPLETE RIGHT BUNDLE BRANCH BLOCK

NO SPECIFIC ECG ABNORMALITIES

RI6.01

No previous ECG available for comparison

## 2019-11-08 NOTE — RAD
Two-view chest dated 11/8/2019.

 

No comparison available.

 

Clinical data indication: Chest pain.

 

FINDINGS:

 

PA and lateral views obtained. Heart and mediastinal contours within 

normal limits. Lungs are somewhat hyperinflated but otherwise clear. No 

consolidation or pleural effusion. No pneumothorax.

 

IMPRESSION:.

No acute radiographic abnormality.

 

Electronically signed by: Bk Perez MD (11/8/2019 6:20 PM) 

[facility]

## 2019-11-08 NOTE — PHYS DOC
Past History


Past Medical History:  No Pertinent History


Past Surgical History:  No Surgical History


Smoking:  Quit Greater Than 1 Year


Alcohol Use:  Rarely


Drug Use:  None





Adult General


Chief Complaint


Chief Complaint:  CHEST PAIN





HPI


HPI





Patient is a 19-year-old male presents with chest discomfort and difficulty 

breathing and cough. This is been going on intermittently for "a while" but 

became worse over the past 90 minutes. Nonproductive cough. No fever. No travel.

No worsening with change of position or exertion. Patient reports that his chest

discomfort is sharp in nature. No radiation. No nausea or vomiting. No 

diaphoresis. Worse with deep breaths. There has been no leg swelling. Symptoms 

are moderate in intensity.[]





Review of Systems


Review of Systems





Constitutional: Denies fever or chills []


Eyes: Denies change in visual acuity, redness, or eye pain []


HENT: Denies nasal congestion or sore throat []


Respiratory: Denies cough or shortness of breath []


Cardiovascular: No additional information not addressed in HPI []


GI: Denies abdominal pain, nausea, vomiting, bloody stools or diarrhea []


: Denies dysuria or hematuria []


Musculoskeletal: Denies back pain or joint pain []


Integument: Denies rash or skin lesions []


Neurologic: Denies headache, focal weakness or sensory changes []


Endocrine: Denies polyuria or polydipsia []





All other systems were reviewed and found to be within normal limits, except as 

documented in this note.





Physical Exam


Physical Exam





Constitutional: Well developed, well nourished, no acute distress, non-toxic 

appearance. []


HENT: Normocephalic, atraumatic, bilateral external ears normal, oropharynx 

moist, no oral exudates, nose normal. []


Eyes: PERRLA, EOMI, conjunctiva normal, no discharge. [] 


Neck: Normal range of motion, no tenderness, supple, no stridor. [] 


Cardiovascular:Heart rate regular rhythm, no murmur []


Lungs & Thorax:  Bilateral breath sounds clear to auscultation []


Abdomen: Bowel sounds normal, soft, no tenderness, no masses, no pulsatile 

masses. [] 


Skin: Warm, dry, no erythema, no rash. [] 


Back: No tenderness, no CVA tenderness. [] 


Extremities: No tenderness, no cyanosis, no clubbing, ROM intact, no edema. [] 


Neurologic: Alert and oriented X 3, normal motor function, normal sensory 

function, no focal deficits noted. []


Psychologic: Affect normal, judgement normal, mood normal. []





Current Patient Data


Vital Signs





                                   Vital Signs








  Date Time  Temp Pulse Resp B/P (MAP) Pulse Ox O2 Delivery O2 Flow Rate FiO2


 


11/8/19 17:32 98.2 80 16  99 Room Air  











EKG


EKG


EKG shows a sinus rhythm at 81 bpm, normal axis, normal QTC at 386 ms, 

incomplete right bundle branch block. No ST elevations. Interpreted at 1809[]





Radiology/Procedures


Radiology/Procedures


PROCEDURE: CHEST PA & LATERAL





Two-view chest dated 11/8/2019.


 


No comparison available.


 


Clinical data indication: Chest pain.


 


FINDINGS:


 


PA and lateral views obtained. Heart and mediastinal contours within 


normal limits. Lungs are somewhat hyperinflated but otherwise clear. No 


consolidation or pleural effusion. No pneumothorax.


 


IMPRESSION:.


No acute radiographic abnormality.


 []





Course & Med Decision Making


Course & Med Decision Making


Pertinent Labs and Imaging studies reviewed. (See chart for details)





ED course: Patient arrived, was placed in bed, and tolerated exam well. He was 

given a breathing treatment which improved his cough, and his heart rate 

improved after the breathing treatment. He was feeling better afterwards. There 

was no significant change in breath sounds after the breathing treatment. He was

 transported to and from radiology with any complications. After the return of 

lab and imaging findings, these were discussed with the patient and his family 

voiced understanding. All questions were answered. Patient was discharged in 

improved condition.





Zoraida decision making: Patient appears to have a bronchitis type of picture. 

There is no evidence of an acute coronary syndrome, pneumonia, pneumothorax, 

pulmonary embolism, dissecting thoracic aortic aneurysm, nor esophageal rupture.

 No evidence of hypoxia.[]





Dragon Disclaimer


Dragon Disclaimer


This electronic medical record was generated, in whole or in part, using a voice

 recognition dictation system.





Departure


Departure:


Impression:  


   Primary Impression:  


   Acute bronchitis


Disposition:  01 HOME, SELF-CARE


Condition:  IMPROVED


Referrals:  


[facility] (PCP)


Patient Instructions:  Acute Bronchitis





Additional Instructions:  


Follow-up with your regular doctor in 2 days. If you do not have regular doctor 

list of local clinics will be provided. Drink plenty of fluids. Take the 

medication as prescribed. Return to the ER if worsening chest discomfort, 

difficulty breathing, or any other concerns.


Scripts


D-Methorphan Hb/Prometh Hcl (PROMETHAZINE-DM SYRUP) 118 Ml Syrup


5 ML PO PRN Q4HRS for CONGESTION, #120 ML


   Prov: ADARSH FIGUEROA DO         11/8/19 


Meloxicam (MELOXICAM) 7.5 Mg Tablet


7.5 MG PO DAILY for PAIN, #20 TAB


   Prov: ADARSH FIGUEROA DO         11/8/19 


Albuterol Sulfate (VENTOLIN HFA INHALER) 18 Gm Hfa.aer.ad


2 PUFF IH PRN Q4HRS PRN for COUGH, #1 INHALER 0 Refills


   Prov: ADARSH FIGUEROA DO         11/8/19





Problem Qualifiers








   Primary Impression:  


   Acute bronchitis


   Bronchitis organism:  unspecified organism  Qualified Codes:  J20.9 - Acute b

   ronchitis, unspecified








ADARSH FIGUEROA DO           Nov 8, 2019 18:08

## 2020-01-30 ENCOUNTER — HOSPITAL ENCOUNTER (EMERGENCY)
Dept: HOSPITAL 63 - ER | Age: 20
Discharge: HOME | End: 2020-01-30
Payer: SELF-PAY

## 2020-01-30 VITALS — DIASTOLIC BLOOD PRESSURE: 77 MMHG | SYSTOLIC BLOOD PRESSURE: 113 MMHG

## 2020-01-30 VITALS — HEIGHT: 70 IN | BODY MASS INDEX: 45.1 KG/M2 | WEIGHT: 315 LBS

## 2020-01-30 DIAGNOSIS — J10.1: Primary | ICD-10-CM

## 2020-01-30 DIAGNOSIS — Z87.891: ICD-10-CM

## 2020-01-30 LAB
INFLUENZA A PATIENT: NEGATIVE
INFLUENZA B PATIENT: POSITIVE

## 2020-01-30 PROCEDURE — 87804 INFLUENZA ASSAY W/OPTIC: CPT

## 2020-01-30 PROCEDURE — 99284 EMERGENCY DEPT VISIT MOD MDM: CPT

## 2020-01-30 NOTE — PHYS DOC
Past History


Past Medical History:  No Pertinent History


Past Surgical History:  No Surgical History


Smoking:  Quit Greater Than 1 Year


Alcohol Use:  Rarely


Drug Use:  None





Adult General


Chief Complaint


Chief Complaint:  FEVER





HPI


HPI


Patient is a 19-year-old male who presents to the emergency department for 

evaluation. He states that since Monday night, he has had myalgias, nasal 

congestion, a nonproductive cough, as well as a sensation of his years being 

stopped up. He has had low-grade fevers, but has not taken any antipyretics in 

the past 36 hours for his symptoms. He denies any shortness of breath, vision 

changes, numbness, or weakness. He admits to a mild headache. He has not had any

nausea vomiting or diarrhea. There are no alleviating or exacerbating factors to

his symptoms. He denies a sore throat.





Review of Systems


Review of Systems





Constitutional: Denies fever or chills []


Eyes: Denies change in visual acuity, redness, or eye pain []


HENT: Denies otalgia or sore throat []


Respiratory: Denies  shortness of breath []


Cardiovascular: The patient denies any shortness of breath, chest pain, 

palpitations, or orthopnea []


GI: Denies abdominal pain, nausea, vomiting, bloody stools or diarrhea []


: Denies dysuria or hematuria []


Musculoskeletal: Denies back pain or joint pain. Reports myalgias. []


Integument: Denies rash or skin lesions []


Neurologic: Denies focal weakness or sensory changes []


Endocrine: Denies polyuria or polydipsia []





All other systems were reviewed and found to be within normal limits, except as 

documented in this note.





Physical Exam


Physical Exam


PHYSICAL EXAM:





CONSTITUTIONAL: Well developed, well nourished, well-appearing, nontoxic-

appearing


HEAD: normocephalic, atraumatic


EENT: PERRL, EOMI. Conjunctivae normal color, sclerae non-icteric; moist mucous 

membranes. Tympanic membranes are normal bilaterally. The oropharynx is 

nonerythematous.


NECK: Supple, non-tender; no meningismus.


LUNGS: Lungs CTA, breathing even and unlabored. Normal air movement.


HEART: Regular rate and rhythm, no murmur


CHEST: No deformity; non-tender


ABDOMEN: The abdomen is soft, and non-tender, no masses or bruits.


EXTREM: Normal ROM; no deformity, no calf tenderness. Normal pulses palpable in 

all extremities. There is no pedal edema.


SKIN: No rash; no diaphoresis


NEURO: Alert; normal speech and cognition; CN's grossly intact; strength grossly

 intact without focal deficit.


BACK: No CVA TTP.





EKG


EKG


[]





Radiology/Procedures


Radiology/Procedures


[]





Course & Med Decision Making


Course & Med Decision Making


Influenza B-positive.


Patient remains stable. I discussed test results, the need for close follow-up, 

and return precautions.





Dragon Disclaimer


Dragon Disclaimer


This electronic medical record was generated, in whole or in part, using a voice

 recognition dictation system.





Departure


Departure:


Impression:  


   Primary Impression:  


   Influenza B


Disposition:  01 HOME, SELF-CARE


Condition:  STABLE


Patient Instructions:  Influenza, Adult


Scripts


Oseltamivir Phosphate (TAMIFLU) 75 Mg Capsule


1 CAP PO BID for -, #10 CAP


   Prov: MARIELLA SANDERS MD         1/30/20











MARIELLA SANDERS MD           Jan 30, 2020 08:06

## 2020-06-24 ENCOUNTER — HOSPITAL ENCOUNTER (EMERGENCY)
Dept: HOSPITAL 63 - ER | Age: 20
Discharge: HOME | End: 2020-06-24
Payer: COMMERCIAL

## 2020-06-24 VITALS — BODY MASS INDEX: 30.86 KG/M2 | HEIGHT: 71 IN | WEIGHT: 220.46 LBS

## 2020-06-24 VITALS — SYSTOLIC BLOOD PRESSURE: 142 MMHG | DIASTOLIC BLOOD PRESSURE: 79 MMHG

## 2020-06-24 DIAGNOSIS — Z87.891: ICD-10-CM

## 2020-06-24 DIAGNOSIS — R11.0: Primary | ICD-10-CM

## 2020-06-24 PROCEDURE — 99283 EMERGENCY DEPT VISIT LOW MDM: CPT

## 2020-06-24 NOTE — PHYS DOC
Past History


Past Medical History:  Other


Additional Past Medical Histor:  kidney failure


Past Surgical History:  Other


Additional Past Surgical Histo:  EYE, CYST REMOVED FROM HIP


Smoking:  Quit Greater Than 1 Year


Alcohol Use:  Rarely


Drug Use:  None





General Adult


EDM:


Chief Complaint:  NAUSEA/VOMITING/DIARRHEA





HPI:


HPI:





Patient is a [age] year old [sex] who presents with []





Review of Systems:


Review of Systems:





Constitutional: Denies fever or chills 


Eyes: Denies redness or eye pain 


HENT: Denies nasal congestion or sore throat


Respiratory: Denies cough or shortness of breath 


Cardiovascular: Denies chest pain or palpitations


GI: Denies abdominal pain, nausea, or vomiting


: Denies dysuria or hematuria


Musculoskeletal: Denies back pain or joint pain


Integument: Denies rash or skin lesions 


Neurologic: Denies headache, focal weakness or sensory changes





Complete systems were reviewed and found to be within normal limits, except as 

documented in this note.





Allergies:


Allergies:





Allergies








Coded Allergies Type Severity Reaction Last Updated Verified


 


  No Known Drug Allergies    1/30/20 No











Physical Exam:


PE:





Constitutional: Well developed, well nourished, no acute distress, non-toxic 

appearance


HENT: Normocephalic, atraumatic, oropharynx moist


Eyes: PERRL, EOMI, conjunctiva normal, no discharge


Neck: Normal range of motion, no tenderness, supple


Cardiovascular: Heart rate normal, regular rhythm


Lungs & Thorax:  Bilateral breath sounds clear to auscultation, no wheezing


Abdomen: Soft, no tenderness


Skin: Warm, dry, no erythema, no rash


Back: No tenderness, no CVA tenderness


Extremities: No tenderness, ROM intact, no edema


Neurologic: Alert and oriented X 3, normal motor function, normal sensory 

function, no focal deficits noted


Psychologic: Affect normal, judgment normal





Current Patient Data:


Vital Signs:





                                   Vital Signs








  Date Time  Temp Pulse Resp B/P (MAP) Pulse Ox O2 Delivery O2 Flow Rate FiO2


 


6/24/20 22:45 97.9 77 18 142/79 (100) 98 Room Air  











EKG:


EKG:


[]





Radiology/Procedures:


Radiology/Procedures:


[]





Course & Med Decision Making:


Course & Med Decision Making


Pertinent Imaging studies reviewed. (See chart for details)





Patient stable for discharge with outpatient follow-up with PCP. Discussed 

findings and plan with patient, who acknowledges understanding and agreement.





Dragon Disclaimer:


Dragon Disclaimer:


This electronic medical record was generated, in whole or in part, using a voice

 recognition dictation system.





Departure


Departure:


Impression:  


   Primary Impression:  


   Nausea


Disposition:  01 HOME/RESIDENCE PRIOR TO ADM


Condition:  STABLE


Referrals:  


PCP,NO (PCP)


Patient Instructions:  Clear Liquid Diet, Easy-to-Read, Nausea, Adult, 

Easy-to-Read


Scripts


Ondansetron (ONDANSETRON ODT) 4 Mg Tab.rapdis


1 TAB PO PRN Q6-8HRS PRN for NAUSEA, #16 TAB


   Prov: KRISTYN RIOS DO         6/24/20





Justification of Admission:


Justification of Admission:


Justification of Admission Dx:  N/A











KRISTYN RIOS DO             Jun 24, 2020 23:07

## 2020-10-17 ENCOUNTER — HOSPITAL ENCOUNTER (EMERGENCY)
Dept: HOSPITAL 63 - ER | Age: 20
Discharge: HOME | End: 2020-10-17
Payer: COMMERCIAL

## 2020-10-17 VITALS — WEIGHT: 202.83 LBS | BODY MASS INDEX: 28.4 KG/M2 | HEIGHT: 71 IN

## 2020-10-17 VITALS — DIASTOLIC BLOOD PRESSURE: 79 MMHG | SYSTOLIC BLOOD PRESSURE: 115 MMHG

## 2020-10-17 DIAGNOSIS — L53.8: ICD-10-CM

## 2020-10-17 DIAGNOSIS — Z87.891: ICD-10-CM

## 2020-10-17 DIAGNOSIS — R21: Primary | ICD-10-CM

## 2020-10-17 DIAGNOSIS — L29.9: ICD-10-CM

## 2020-10-17 DIAGNOSIS — N19: ICD-10-CM

## 2020-10-17 PROCEDURE — 99283 EMERGENCY DEPT VISIT LOW MDM: CPT

## 2020-10-17 NOTE — PHYS DOC
Past History


Past Medical History:  Renal Failure, Other


Additional Past Medical Histor:  kidney failure -age [age]; cyst on rt hip


Past Surgical History:  Other


Additional Past Surgical Histo:  EYE, CYST REMOVED FROM HIP


Smoking:  Quit Greater Than 1 Year


Alcohol Use:  Occasionally


Drug Use:  None


Social History Narrative:  used to smoke marijuana but has not for 4-5 yrs





General Adult


EDM:


Chief Complaint:  SKIN RASH/ABSCESS





HPI:


HPI:


20-year-old male presents with bilateral palmar rash.  He has had off-and-on 

problems with this for couple months.  Over the last few days it is gotten worse

and been persistent.  It is intensely pruritic.  There are some mild 

erythematous areas mostly on the palms with a couple scattered dots on the 

wrist.  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  He

does not have any known allergies.  He has tried antifungal cream at home 

without relief.  He has not tried any steroids.  He does not have a family 

doctor at this time.





Review of Systems:


Review of Systems:


Constitutional:  Denies fever or chills 


Eyes:  Denies change in visual acuity 


HENT:  Denies nasal congestion or sore throat 


Respiratory:  Denies cough or shortness of breath 


Cardiovascular:  Denies chest pain or edema 


GI:  Denies abdominal pain, nausea, vomiting, bloody stools or diarrhea 


: Denies dysuria 


Musculoskeletal:  Denies back pain or joint pain 


Integument: Rash of hands


Neurologic:  Denies headache, focal weakness or sensory changes 


Endocrine:  Denies polyuria or polydipsia 


Lymphatic:  Denies swollen glands 


Psychiatric:  Denies depression or anxiety





Heart Score:


Risk Factors:


Risk Factors:  DM, Current or recent (<one month) smoker, HTN, HLP, family 

history of CAD, obesity.


Risk Scores:


Score 0 - 3:  2.5% MACE over next 6 weeks - Discharge Home


Score 4 - 6:  20.3% MACE over next 6 weeks - Admit for Clinical Observation


Score 7 - 10:  72.7% MACE over next 6 weeks - Early Invasive Strategies





Allergies:


Allergies:





Allergies








Coded Allergies Type Severity Reaction Last Updated Verified


 


  No Known Drug Allergies    10/17/20 No











Physical Exam:


PE:





Constitutional: Well developed, well nourished, no acute distress, non-toxic 

appearance. []


HENT: Normocephalic, atraumatic, bilateral external ears normal, oropharynx 

moist, no oral exudates, nose normal. []


Eyes: PERRLA, EOMI, conjunctiva normal, no discharge. [] 


Neck: Normal range of motion, no tenderness, supple, no stridor. [] 


Cardiovascular:Heart rate regular rhythm, no murmur []


Lungs & Thorax:  Bilateral breath sounds clear to auscultation []


Abdomen: Bowel sounds normal, soft, no tenderness, no masses, no pulsatile 

masses. [] 


Skin: Mildly erythematous patches on the bilateral palms with scattered papules 

on the bilateral wrists.  [] 


Back: No tenderness, no CVA tenderness. [] 


Extremities: No tenderness, no cyanosis, no clubbing, ROM intact, no edema. [] 


Neurologic: Alert and oriented X 3, normal motor function, normal sensory 

function, no focal deficits noted. []


Psychologic: Affect normal, judgement normal, mood normal. []





Current Patient Data:


Vital Signs:





                                   Vital Signs








  Date Time  Temp Pulse Resp B/P (MAP) Pulse Ox O2 Delivery O2 Flow Rate FiO2


 


10/17/20 22:30 98.1 71 16 115/79 (91) 98 Room Air  











EKG:


EKG:


[]





Radiology/Procedures:


Radiology/Procedures:


[]





Course & Med Decision Making:


Course & Med Decision Making


Pertinent Labs and Imaging studies reviewed. (See chart for details)


Not sure what is causing the patient's rash.  He could be allergic to latex or 

the powder and then powdered gloves.  He uses all different types of bras where 

he works.  He has tried an antifungal without relief so that seems unlikely.  I 

will try triamcinolone cream at home.  I have also encouraged him to just use 

nitrile based close and see if his condition improves.  He will establish with a

 primary care physician.  He is stable for discharge at this time.


[]





Dragon Disclaimer:


Dragon Disclaimer:


This electronic medical record was generated, in whole or in part, using a voice

 recognition dictation system.





Departure


Departure:


Impression:  


   Primary Impression:  


   Rash of both hands


Disposition:  01 DC HOME SELF CARE/HOMELESS


Condition:  STABLE


Referrals:  


PCP,NO (PCP)


Patient Instructions:  Rash, Easy-to-Read


Scripts


Triamcinolone Acetonide (TRIAMCINOLONE ACETONIDE 0.5% CREAM) 15 Gm Cream..g.


1 YOON TP BID for rash for 7 Days, #30 GM


   Prov: JARROD SORTO DO         10/17/20











JARROD SORTO DO                 Oct 17, 2020 23:17

## 2020-10-24 ENCOUNTER — HOSPITAL ENCOUNTER (EMERGENCY)
Dept: HOSPITAL 63 - ER | Age: 20
Discharge: HOME | End: 2020-10-24
Payer: COMMERCIAL

## 2020-10-24 VITALS — HEIGHT: 71 IN | WEIGHT: 210.54 LBS | BODY MASS INDEX: 29.48 KG/M2

## 2020-10-24 VITALS — DIASTOLIC BLOOD PRESSURE: 90 MMHG | SYSTOLIC BLOOD PRESSURE: 125 MMHG

## 2020-10-24 DIAGNOSIS — R11.2: Primary | ICD-10-CM

## 2020-10-24 DIAGNOSIS — Z87.891: ICD-10-CM

## 2020-10-24 DIAGNOSIS — Z20.828: ICD-10-CM

## 2020-10-24 DIAGNOSIS — N19: ICD-10-CM

## 2020-10-24 PROCEDURE — 99283 EMERGENCY DEPT VISIT LOW MDM: CPT

## 2020-10-24 PROCEDURE — C9803 HOPD COVID-19 SPEC COLLECT: HCPCS

## 2020-10-24 PROCEDURE — U0003 INFECTIOUS AGENT DETECTION BY NUCLEIC ACID (DNA OR RNA); SEVERE ACUTE RESPIRATORY SYNDROME CORONAVIRUS 2 (SARS-COV-2) (CORONAVIRUS DISEASE [COVID-19]), AMPLIFIED PROBE TECHNIQUE, MAKING USE OF HIGH THROUGHPUT TECHNOLOGIES AS DESCRIBED BY CMS-2020-01-R: HCPCS

## 2020-10-24 NOTE — PHYS DOC
Past History


Past Medical History:  Renal Failure, Other


Additional Past Medical Histor:  kidney failure -age [age]; cyst on rt hip


Past Surgical History:  Other


Additional Past Surgical Histo:  EYE, CYST REMOVED FROM HIP


Smoking:  Quit Greater Than 1 Year


Alcohol Use:  Occasionally


Drug Use:  None





Adult General


Chief Complaint


Chief Complaint:  NAUSEA/VOMITING/DIARRHEA





HPI


HPI





Patient is a healthy 20-year-old male who presents needing COVID-19 swab.  

Patient reports working at local [employer] [facility] [employer] that has multiple

known COVID-19 contacts.  He has recently been in COVID19 area of [facility] 

providing care.  Patient reports x48 hours of generalized URI-like symptoms, 

nausea with x2 episodes of nonbloody nonbilious vomit today.  Patient brought 

daughter in for evaluation to our ER yesterday.  Today, patient did not feel 

well prior to work and so, patient was advised from his work to seek evaluation 

at our ER and received COVID-19 swab.  He has been afebrile, vaccines are 

up-to-date





Review of Systems


Review of Systems


Fourteen body systems of review of systems have been reviewed. See HPI for 

pertinent positives and negative responses, other wise all other systems are 

negative, non-pertinent or non-contributory





Allergies


Allergies





Allergies








Coded Allergies Type Severity Reaction Last Updated Verified


 


  No Known Drug Allergies    10/17/20 No











Physical Exam


Physical Exam


Constitutional: Well developed, well nourished, no acute distress, non-toxic 

appearance. 


HENT: Normocephalic, atraumatic, bilateral external ears normal, oropharynx 

moist, no oral exudates, nose normal. 


Eyes: PERRLA, EOMI, conjunctiva normal, no discharge.  


Neck: Normal range of motion, no tenderness, supple, no stridor.  


Cardiovascular: Heart rate regular, sinus rhythm, no murmurs rubs or gallops


Lungs & Thorax:  Bilateral breath sounds clear to auscultation 


Abdomen: Bowel sounds normal, soft, no tenderness, no masses, no pulsatile 

masses.  Nonsurgical abdomen, no peritoneal signs


Skin: Warm, dry, no erythema, no rash.  


Back: No tenderness, no CVA tenderness.  


Extremities: No tenderness, no cyanosis, no clubbing, ROM intact, no edema.  


Neurologic: Alert and oriented X 3, grossly normal motor & sensory function, no 

focal deficits noted. 


Psychologic: Affect normal, judgement normal, mood normal.





Current Patient Data


Vital Signs





Vital Signs








  Date Time  Temp Pulse Resp B/P (MAP) Pulse Ox O2 Delivery O2 Flow Rate FiO2


 


10/24/20 17:37 97.8 69 18 125/90 (102) 98   











EKG


EKG


[]





Radiology/Procedures


Radiology/Procedures


[]





Heart Score


Risk Factors:


Risk Factors:  DM, Current or recent (<one month) smoker, HTN, HLP, family 

history of CAD, obesity.


Risk Scores:


Risk Factors:  DM, Current or recent (<one month) smoker, HTN, HLP, family 

history of CAD, obesity.





Course & Med Decision Making


Course & Med Decision Making


Pertinent Labs and Imaging studies reviewed. (See chart for details)





Discussed most likely diagnosis of nausea and vomit versus COVID-19 versus other


Patient swabbed for COVID-19 per patient and employer request


I discussed utility of lab work and other work-up modalities in ER setting but g

iven that patient is well-appearing, hemodynamically stable and tolerating p.o. 

intake, I feel this is inappropriate at present.  Patient agreed with this as 

well


I did disclose this might be an acute presentation more concerning pathology and

 such, close return precautions were discussed


All questions and concerns addressed prior to ER departure in stable condition 

with continued supportive care advised, self quarantine until COVID-19 test 

resumes, and eventual outpatient PCP follow-up





Dragon Disclaimer


Dragon Disclaimer


This electronic medical record was generated, in whole or in part, using a voice

 recognition dictation system.





Departure


Departure:


Impression:  


   Primary Impression:  


   Person under investigation for COVID-19


   Additional Impression:  


   Nausea & vomiting


Disposition:  01 DC HOME SELF CARE/HOMELESS


Condition:  STABLE


Referrals:  


PCP,NO (PCP)





Additional Instructions:  


As discussed, please use the attached sheet to identify a primary care physician

 of your liking, call them and schedule an appointment in upcoming +14 days 

after quarantine or negative Covid test


Please utilize the instructions below regarding COVID-19 self-care instructions.

  If you have any concerning signs or symptoms at present prior to outpatient 

follow-up please do not hesitate to come back for repeat examination


Is a pleasure to take care of you today and I wish you speedy recovery!








You were evaluated in the Emergency Department today for a cough. Your 

evaluation suggests a viral infection such as Coronavirus. It is important that 

you continue to self isolate and practice good hygiene at home.


Please follow up with your primary care physician as discussed.


Return to the Emergency Department if you experience worsening cough, fever, 

shortness of breath, recurrent vomiting, lethargy, or any other concerning 

symptoms.


Thank you for choosing us for your care.





Home Care Instructions for Patients with Mild Respiratory Infection


Most people with respiratory infections like colds, the flu, and Coronavirus 

Disease (COVID-19) will have mild illness and can get better with appropriate 

home care and without the need to see a provider. People who are elderly, 

pregnant, or have a weak immune system, or other medical problem are at higher 

risk of more serious illness or complications. It is recommended that they 

carefully monitor their symptoms closely and seek medical care early if their 

symptoms get worse.





Treatment


There is no specific treatment for most viruses including those that that cause 

the common cold and those that cause COVID-19. Sometimes there is treatment for 

the viruses that cause influenza if given early. Antibiotics treat infections 

caused by bacteria, but they do not work against viruses.Most people recover on 

their own from these viruses, including COVID-19. Here are steps that you can 

take to help you get better:


 Rest


 Drink plenty of fluids


 Take over-the-counter cold and flu medications to reduce fever and pain. 

Follow the instructions on the package, unless your doctor gave you 

instructions. Note that these medicines do not ``cure the illness and 

therefore do not stop you from spreading germs.


 Children should not be given medication that contains aspirin (acetylsalicylic

 acid) because it can cause a rare but serious illness called Reyes syndrome. 

Medicines without aspirin include acetaminophen (Tylenol) and ibuprofen 

(Advil, Motrin). Children younger than age 2 should not be given any 

over-the-counter cold medications without first speaking with a doctor.Seeking 

Medical Care


You should seek medical care if you are not getting better within a week, or if 

your symptoms get worse. If you are elderly, pregnant, have a weak immune 

system, or other medical problems, call your doctor right away.


It is best to call ahead of time to discuss your symptoms, if possible. This may

 allow you to receive the advice you need by phone. By avoiding a visit to a 

healthcare facility, you protect yourself from getting a new infection and 

protect others from catching an infection from you. If you do visit a healthcare

 facility, put on a mask to protect other patients and staff.


It is recommended that you seek medical care for serious symptoms, such as:


People with potentially life-threatening symptoms should call 911. If possible, 

put on a facemask before emergency medical services arrive.PROTECTING OTHERS


Follow the steps below to help prevent the disease from spreading to people in 

your home and community.Stay home when you are sick


 Stay home - do not go to work, school, or public areas.


 Stay home for at least 24 hours after your symptoms have gone away without the

 use of fever-reducing medicines.


 If you must leave home while you are sick, try to avoid using public 

transportation, ride-shares, and taxis. Wear a mask if possible.


Separate yourself from other people and animals in your home


 Stay in a specific room and away from other people in your home as much as 

possible.


 Use a separate bathroom, if available.


 Try to stay at least 6 feet from others.


 Do not handle pets or other animals while you are sick.


Cover your coughs and sneezes


 Cover your mouth and nose with a tissue when you cough or sneeze. Throw used 

tissues in a lined trash can; immediately wash your hands.


Avoid sharing personal household items


 Do not share dishes, drinking glasses, cups, eating utensils, towels, or 

bedding with other people or pets in your home. Wash them thoroughly with soap 

and water after use.


Clean your hands often


 Wash your hands often with soap and water for at least 20 seconds. If soap and

 water are not available, clean your hands with an alcohol-based hand  

that contains at least 60% alcohol, covering all surfaces of your hands and 

rubbing them together until they feel dry. Use soap and water if your hands are 

visibly dirty.


Clean all ``high-touch surfaces every day


 High touch surfaces include counters, tabletops, doorknobs, bathroom fixtures,

 toilets, phones, keyboards, tablets, and bedside tables. Also, clean any 

surfaces that may have body fluids on them. Use a household cleaning spray or 

wipe, according to the product label instructions.





COVID-19 (Novel Coronavirus) FAQs for Inquiring Patients


What do you do if you are worried that you have been exposed to COVID-19 but are

 without any symptoms?


If you develop symptoms that may indicate an infection, contact your physician. 

These include fever, cough, and shortness of breath.


Testing is not available for asymptomatic individuals, regardless of travel 

history.


To reduce the chance of getting sick use general infection prevention measures 

such as hand washing, covering your mouth and nose when you cough or sneeze and 

discarding any tissues carefully, and staying home when you are sick.Can 

exceptions be made for patients who are really worried and want to be tested?


Presently testing is only available through the [facility] Department of

 [facility] and Centers for Disease Control and Prevention. Only patients who

 meet the updated COVID-19 PUI definition may be tested. We do not control or 

set the PUI definition or evaluation criteria. We are unable to provide testing 

to patients who do not meet the strict criteria.


Should patients cancel or postpone an upcoming trip?


The decision about travel is personal and should be made in the context of a 

persons underlying health conditions, reason for travel and necessity of 

travel.


Travel insurance generally does not cover cancellations due to concerns of 

infectious disease outbreaks.


The Center for Disease Control has a section on travel notices. Situations are 

changing frequently and you should monitor the site for updates.


Should situations change rapidly in a foreign country while they are traveling, 

you could be subject to quarantine or restrictions upon return to the [place] 

[place]. It is best to have a plan on how to return urgently if needed during a 

trip abroad.


Because of how air circulates and is filtered on airplanes, most viruses do not 

spread easily on airplanes. CDC does not recommend use of facemasks during air 

travel.What other general precautions are advised?


Patients should be instructed to:


Avoid close contact with people who are sick.


Avoid touching your eyes, nose and mouth.


Stay home from work or school when they are sick. If you have a fever, you 

should remain home until 24 hours after fever resolves.


Clean and disinfect frequently touched objects and surfaces using a regular 

household cleaning spray or wipe.


Sneeze/cough into their elbow, not your hand.


Practice frequent hand hygiene with soap and water (at least 20 seconds) or 

alcohol-based hand rub.


Consider avoiding crowded places or mass gatherings, especially if you are 

immunocompromised or have chronic lung disease.


There is no evidence to support transmission of COVID-19 from goods imported 

from [place].


Are there any special precautions that are recommended if I am pregnant?


There is not yet any information available about the susceptibility of pregnant 

women to COVID-19. As a general rule, pregnant women may be more susceptible to 

viral respiratory infections and at risk for more severe illness.


The CDC guidance for COVID-19 and pregnancy has answers to questions about 

transmission during delivery, breastfeeding as well as other situations.





Should food, water, or medications be stockpiled? Should people telecommute?


The CDC has excellent information on this. Please visit the CDCs guidance for 

getting your household ready for COVID-19.





What should I do if I start feeling sick at work? And what should the workplace 

do for anyone exposed?


Anyone who is sick with a fever and cough should stay home from work until at 

least 24 hours after resolution of fever, regardless of concerns for COVID-19. 

It is still influenza (flu) season and influenza remains far more common.





Problem Qualifiers











JAS GALLO DO                 Oct 24, 2020 17:18

## 2020-11-06 ENCOUNTER — HOSPITAL ENCOUNTER (EMERGENCY)
Dept: HOSPITAL 63 - ER | Age: 20
LOS: 1 days | Discharge: HOME | End: 2020-11-07
Payer: COMMERCIAL

## 2020-11-06 VITALS — WEIGHT: 210.54 LBS | HEIGHT: 71 IN | BODY MASS INDEX: 29.48 KG/M2

## 2020-11-06 VITALS — DIASTOLIC BLOOD PRESSURE: 80 MMHG | SYSTOLIC BLOOD PRESSURE: 135 MMHG

## 2020-11-06 DIAGNOSIS — Z87.891: ICD-10-CM

## 2020-11-06 DIAGNOSIS — N19: ICD-10-CM

## 2020-11-06 DIAGNOSIS — L98.8: ICD-10-CM

## 2020-11-06 DIAGNOSIS — R21: Primary | ICD-10-CM

## 2020-11-06 PROCEDURE — 86200 CCP ANTIBODY: CPT

## 2020-11-06 PROCEDURE — 86140 C-REACTIVE PROTEIN: CPT

## 2020-11-06 PROCEDURE — 36415 COLL VENOUS BLD VENIPUNCTURE: CPT

## 2020-11-06 PROCEDURE — 86038 ANTINUCLEAR ANTIBODIES: CPT

## 2020-11-06 PROCEDURE — 86592 SYPHILIS TEST NON-TREP QUAL: CPT

## 2020-11-06 PROCEDURE — 96372 THER/PROPH/DIAG INJ SC/IM: CPT

## 2020-11-06 PROCEDURE — 99284 EMERGENCY DEPT VISIT MOD MDM: CPT

## 2020-11-06 NOTE — PHYS DOC
Past History


Past Medical History:  No Pertinent History, Renal Failure, Other


Additional Past Medical Histor:  kidney failure -age [age]; cyst on rt hip


Past Surgical History:  Other


Additional Past Surgical Histo:  EYE, CYST REMOVED FROM HIP


Smoking:  Quit Greater Than 1 Year


Alcohol Use:  Occasionally


Drug Use:  None





General Adult


EDM:


Chief Complaint:  SKIN RASH/ABSCESS





HPI:


HPI:


".I was here the other day...but I got this rash on my feet..and now on my 

hands..."





Patient is a 20 year old male who presents with above hx and complaints of rash.

  I been using a antibiotic cream on but has not helped.  Patient has somewhat 

atypical subcu lesions of palms and soles.  Lesions do xander with pressure.  

Patient does have some areas of be suggestive as fungal type lesions.   Pt. has 

hx of renal failure as teenage.  Patient denies any history immunosuppression,  

no recent travel.  No specific ill contacts.  Patient has not been following 

with her primary care recently.  Patient states his lesions today ears very 

pruritic.  Patient states the antibiotic ointment he has been using has now ran 

out.





Review of Systems:


Review of Systems:


Constitutional:  Denies fever or chills 


Eyes:  Denies change in visual acuity 


HENT:  Denies nasal congestion or sore throat 


Respiratory:  Denies cough or shortness of breath 


Cardiovascular:  Denies chest pain or edema 


GI:  Denies abdominal pain, nausea, vomiting, bloody stools or diarrhea 


: Denies dysuria 


Musculoskeletal:  Denies back pain or joint pain 


Integument: Complains of palmar and plantar rash 


Neurologic:  Denies headache, focal weakness or sensory changes 


Endocrine:  Denies polyuria or polydipsia 


Lymphatic:  Denies swollen glands 


Psychiatric:  Denies depression or anxiety





Family History:


Family History:


Noncontributory to presentation





Current Medications:


Current Meds:


See nursing for home meds





Allergies:


Allergies:





Allergies








Coded Allergies Type Severity Reaction Last Updated Verified


 


  No Known Drug Allergies    10/17/20 No











Physical Exam:


PE:





Constitutional: Well developed, well nourished, moderate acute distress, non-

toxic appearance. []


HENT: Normocephalic, atraumatic, bilateral external ears normal, oropharynx 

moist, no oral exudates, nose normal. []


Eyes: PERRLA, EOMI, conjunctiva normal, no discharge. [] 


Neck: Normal range of motion, no tenderness, supple, no stridor. [] 


Cardiovascular:Heart rate regular rhythm, no murmur []


Lungs & Thorax:  Bilateral breath sounds clear to auscultation []


Abdomen: Bowel sounds normal, soft, no tenderness, no masses, no pulsatile 

masses. [] 


Skin: Warm, dry, no erythema, erythemic rash that appears to be subcu in palms 

and soles


Back: No tenderness, no CVA tenderness. [] 


Extremities: No tenderness, no cyanosis, no clubbing, ROM intact, no edema. [] 


Neurologic: Alert and oriented X 3, normal motor function, normal sensory 

function, no focal deficits noted. []


Psychologic: Affect normal, judgement normal, mood normal. []





EKG:


EKG:


[]





Radiology/Procedures:


Radiology/Procedures:


[]





Heart Score:


Risk Factors:


Risk Factors:  DM, Current or recent (<one month) smoker, HTN, HLP, family 

history of CAD, obesity.


Risk Scores:


Score 0 - 3:  2.5% MACE over next 6 weeks - Discharge Home


Score 4 - 6:  20.3% MACE over next 6 weeks - Admit for Clinical Observation


Score 7 - 10:  72.7% MACE over next 6 weeks - Early Invasive Strategies





Course & Med Decision Making:


Course & Med Decision Making


Pertinent Labs and Imaging studies reviewed. (See chart for details)





Patient to use Benadryl 50 mg at 4 times a day for itching.  Patient to take 

Keflex 500 mg 3 times a day.  Patient to follow-up primary care.  Patient to use

 antifungal ointment or cream 3 times a day on palms and soles.  Patient consi

lori skin biopsy if no improvement with antibiotics and antifungal treatments.  





Impression:





1.Rash-erythemic subcu palms and soles





[]





Dragon Disclaimer:


Dragon Disclaimer:


This electronic medical record was generated, in whole or in part, using a voice

 recognition dictation system.





Departure


Departure:


Referrals:  


PCP,FARAZ (PCP)


Scripts


Cephalexin (KEFLEX) 500 Mg Capsule


500 MG PO TID for rash, #30 BOTTLE


   Prov: JANAE GURROLA MD         11/6/20





Dragon Disclaimer


This chart was dictated in whole or in part using Voice Recognition software in 

a busy, high-work load, and often noisy Emergency Department environment.  It 

may contain unintended and wholly unrecognized errors or omissions.











JANAE GURROLA MD            Nov 6, 2020 23:15

## 2020-11-20 ENCOUNTER — HOSPITAL ENCOUNTER (EMERGENCY)
Dept: HOSPITAL 63 - ER | Age: 20
Discharge: HOME | End: 2020-11-20
Payer: COMMERCIAL

## 2020-11-20 VITALS — DIASTOLIC BLOOD PRESSURE: 76 MMHG | SYSTOLIC BLOOD PRESSURE: 139 MMHG

## 2020-11-20 VITALS — BODY MASS INDEX: 29.48 KG/M2 | WEIGHT: 210.54 LBS | HEIGHT: 71 IN

## 2020-11-20 DIAGNOSIS — L20.9: Primary | ICD-10-CM

## 2020-11-20 DIAGNOSIS — N19: ICD-10-CM

## 2020-11-20 DIAGNOSIS — L73.9: ICD-10-CM

## 2020-11-20 DIAGNOSIS — Z87.891: ICD-10-CM

## 2020-11-20 PROCEDURE — 99283 EMERGENCY DEPT VISIT LOW MDM: CPT

## 2020-11-20 NOTE — PHYS DOC
Past History


Past Medical History:  Renal Failure, Other


Additional Past Medical Histor:  kidney failure -age 15-16; cyst on rt hip


Past Surgical History:  Other


Additional Past Surgical Histo:  EYE, CYST REMOVED FROM HIP


Smoking:  Quit Greater Than 1 Year


Alcohol Use:  Occasionally


Drug Use:  None





General Adult


EDM:


Chief Complaint:  SKIN RASH/ABSCESS





HPI:


HPI:





Patient is a 20-year-old male who presents to the emergency department with 

reports of a rash between his legs, to his chest, bilateral hands for several 

weeks.  Patient reports he has been seen by his primary care doctor who 

prescribed him triamcinolone cream and Keflex.  Patient states he has been 

taking his medications but the rash continues.  He denies any medical history.  

Patient denies any new detergents, fragrances, foods, or environmental 

exposures.  He denies any shortness of breath, wheezing, nausea, or vomiting.  

He states that the rash is very itchy.  He currently denies any pain.





Review of Systems:


Review of Systems:


Complete ROS is negative unless otherwise noted in HPI.





Allergies:


Allergies:





Allergies








Coded Allergies Type Severity Reaction Last Updated Verified


 


  No Known Drug Allergies    10/17/20 No











Physical Exam:


PE:


See Above


Constitutional: Well developed, well nourished, no acute distress, non-toxic 

appearance. []


HENT: Normocephalic, atraumatic, bilateral external ears normal, nose normal. []


Eyes: PERRLA, EOMI, conjunctiva normal, no discharge. [] 


Neck: Normal range of motion, no stridor. [] 


Cardiovascular:Heart rate regular rhythm


Lungs & Thorax:  Respirations even and unlabored, no retractions, no respiratory

 distress


Skin: Warm, dry; patches of fine, dry, scaly rash to bilateral hands, upper 

extremities and chest consistent with atopic dermatitis; erythemic, papular rash

 with some pustules noted to bilateral inner thighs, concerning for 

folliculitis.


Extremities: No cyanosis, ROM intact, no edema. [] 


Neurologic: Alert and oriented X 3, no focal deficits noted. []


Psychologic: Affect normal, judgement normal, mood normal. []





Current Patient Data:


Vital Signs:





                                   Vital Signs








  Date Time  Temp Pulse Resp B/P (MAP) Pulse Ox O2 Delivery O2 Flow Rate FiO2


 


11/20/20 16:33 98.3 87 16 139/76 (97) 97 Room Air  











EKG:


EKG:


[]





Radiology/Procedures:


Radiology/Procedures:


[]





Heart Score:


Risk Factors:


Risk Factors:  DM, Current or recent (<one month) smoker, HTN, HLP, family 

history of CAD, obesity.


Risk Scores:


Score 0 - 3:  2.5% MACE over next 6 weeks - Discharge Home


Score 4 - 6:  20.3% MACE over next 6 weeks - Admit for Clinical Observation


Score 7 - 10:  72.7% MACE over next 6 weeks - Early Invasive Strategies





Course & Med Decision Making:


Course & Med Decision Making


Pertinent Labs and Imaging studies reviewed. (See chart for details)





[]





Dragon Disclaimer:


Dragon Disclaimer:


This electronic medical record was generated, in whole or in part, using a voice

 recognition dictation system.





Departure


Departure:


Impression:  


   Primary Impression:  


   Atopic dermatitis


   Qualified Codes:  L20.9 - Atopic dermatitis, unspecified


   Additional Impression:  


   Folliculitis


Disposition:  01 DC HOME SELF CARE/HOMELESS


Condition:  STABLE


Referrals:  


PCP,NO (PCP)


Patient Instructions:  Eczema, Folliculitis





Additional Instructions:  


Fill the prescription and use it as directed.  Recommend that you purchase some 

Cetaphil moisturizing cream to apply to your entire body twice a day.  Limit hot

 showers as discussed, I recommend that you lightly towel off after a shower and

 mainly air dry.  Continue taking the Keflex as prescribed by the previous 

provider.  I recommend that you follow-up with a dermatologist for further 

evaluation of this ongoing problem.  Return to the ER if your symptoms worsen or

 you develop a fever.


Scripts


Triamcinolone Acetonide (TRIAMCINOLONE ACETONIDE 0.1% CREAM) 15 Gm Cream..g.


1 YOON TP BID for rash for 10 Days, #2 TUBE 0 Refills


   Prov: GIANNI BALDERRAMA APRN         11/20/20











GIANNI BALDERRAMA APRN       Nov 20, 2020 17:04

## 2020-12-01 ENCOUNTER — HOSPITAL ENCOUNTER (EMERGENCY)
Dept: HOSPITAL 63 - ER | Age: 20
Discharge: HOME | End: 2020-12-01
Payer: COMMERCIAL

## 2020-12-01 VITALS
WEIGHT: 210.54 LBS | DIASTOLIC BLOOD PRESSURE: 109 MMHG | SYSTOLIC BLOOD PRESSURE: 143 MMHG | HEIGHT: 71 IN | BODY MASS INDEX: 29.48 KG/M2

## 2020-12-01 DIAGNOSIS — M25.552: ICD-10-CM

## 2020-12-01 DIAGNOSIS — N19: ICD-10-CM

## 2020-12-01 DIAGNOSIS — M25.551: Primary | ICD-10-CM

## 2020-12-01 DIAGNOSIS — R93.41: ICD-10-CM

## 2020-12-01 DIAGNOSIS — Z87.891: ICD-10-CM

## 2020-12-01 PROCEDURE — 73521 X-RAY EXAM HIPS BI 2 VIEWS: CPT

## 2020-12-01 PROCEDURE — 99283 EMERGENCY DEPT VISIT LOW MDM: CPT

## 2020-12-01 NOTE — PHYS DOC
Past History


Past Medical History:  Renal Failure, Other


Additional Past Medical Histor:  kidney failure -age [age]; cyst on rt hip


Past Surgical History:  Other


Additional Past Surgical Histo:  EYE, CYST REMOVED FROM HIP


Smoking:  Quit Greater Than 1 Year


Alcohol Use:  Occasionally


Drug Use:  None





General Adult


EDM:


Chief Complaint:  HIP PAIN





HPI:


HPI:





Patient is a 20-year-old male who presents to the emergency department with 

complaints of a grinding sensation in his left hip for several weeks weeks, and 

pain and popping in his right hip that began today.  Patient denies any known 

injury.  He states he did have to lift a heavy inmate at work last night.  He 

denies any back pain, saddle anesthesia, or loss of bowel/bladder control.  

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  He 

denies any recent weight loss, fatigue, unexplained bruising, fever, cough, or 

shortness of breath.  He currently rates his pain a 7 out of 10 on the pain 

scale.  Patient states he has tried taking over-the-counter ibuprofen with no 

relief in his symptoms.  He reports that the pain is worse with movement.





Review of Systems:


Review of Systems:


Complete ROS is negative unless otherwise noted in HPI.





Allergies:


Allergies:





Allergies








Coded Allergies Type Severity Reaction Last Updated Verified


 


  No Known Drug Allergies    10/17/20 No











Physical Exam:


PE:


See Above


Constitutional: Well developed, well nourished, no acute distress, non-toxic 

appearance. []


HENT: Normocephalic, atraumatic, bilateral external ears normal, nose normal. []


Eyes: PERRLA, EOMI, conjunctiva normal, no discharge. [] 


Neck: Normal range of motion, no stridor. [] 


Cardiovascular:Heart rate regular rhythm


Lungs & Thorax:  Respirations even and unlabored, no retractions, no respiratory

 distress


Skin: Warm, dry, no erythema, no rash. [] 


Extremities: Bilateral hips and pelvis: Nontender to palpation, no obvious 

deformity, no cyanosis, ROM intact, no edema. [] 


Neurologic: Alert and oriented X 3, no focal deficits noted. []


Psychologic: Affect normal, judgement normal, mood normal. []





Current Patient Data:


Vital Signs:





                                   Vital Signs








  Date Time  Temp Pulse Resp B/P (MAP) Pulse Ox O2 Delivery O2 Flow Rate FiO2


 


12/1/20 18:13 97.7 82 18 143/109 (120) 98 Room Air  











EKG:


EKG:


[]





Radiology/Procedures:


Radiology/Procedures:


PROCEDURE: HIP BILATERAL WITH PELVIS





Exam: Pelvis with bilateral hips


 


INDICATION: Left hip pain,


 


TECHNIQUE: Frontal view of the pelvis with bilateral frog-leg lateral 


views of the left hip


 


Comparisons: None


 


FINDINGS:


Heterogenous sclerotic area within the left iliac bone. Bone 


mineralization is otherwise normal. No acute or healed fractures. Soft 


tissues are unremarkable.


 


IMPRESSION:


Sclerotic expansile bubbly lesion within the left iliac bone which is 


nonspecific could relate to fibrous dysplasia versus aneurysmal bone cyst.


Recommend correlation with nonemergent MRI of the pelvis without and with 


contrast to exclude other etiologies.[]





Heart Score:


Risk Factors:


Risk Factors:  DM, Current or recent (<one month) smoker, HTN, HLP, family 

history of CAD, obesity.


Risk Scores:


Score 0 - 3:  2.5% MACE over next 6 weeks - Discharge Home


Score 4 - 6:  20.3% MACE over next 6 weeks - Admit for Clinical Observation


Score 7 - 10:  72.7% MACE over next 6 weeks - Early Invasive Strategies





Course & Med Decision Making:


Course & Med Decision Making


Pertinent Labs and Imaging studies reviewed. (See chart for details)


2050-I spoke with Dr. Singh about the patient's hip and pelvis x-ray.  He 

recommends that the patient follow-up with a orthopedic oncologist at [facility].  I will provide the patient with the phone number for medical oncology 

at  for further evaluation.  Will prescribe patient hydrocodone to take as 

needed for severe pain.


[]





Dragon Disclaimer:


Dragon Disclaimer:


This electronic medical record was generated, in whole or in part, using a voice

 recognition dictation system.





Departure


Departure:


Impression:  


   Primary Impression:  


   Hip pain, bilateral


   Additional Impression:  


   Abnormal x-ray of pelvis


Disposition:  01 DC HOME SELF CARE/HOMELESS


Condition:  STABLE


Referrals:  


PCP,NO (PCP)


Patient Instructions:  Hip Pain





Additional Instructions:  


Fill prescription(s) and use as directed. Recommend application of ice, 

elevation, and rest of affected extremity.  Follow-up with a medical oncologist 

at [facility].  You can call  [phone] or [phone] to schedule an

 appointment. Return to the ER if your symptoms worsen. 





Your x-ray today revealed:





FINDINGS:


Heterogenous sclerotic area within the left iliac bone. Bone 


mineralization is otherwise normal. No acute or healed fractures. Soft 


tissues are unremarkable.


 


IMPRESSION:


Sclerotic expansile bubbly lesion within the left iliac bone which is 


nonspecific could relate to fibrous dysplasia versus aneurysmal bone cyst.


Scripts


Hydrocodone Bit/Acetaminophen (HYDROCODONE-APAP 5-325  **) 1 Each Tablet


0.5-1 TAB PO PRN Q6HRS PRN for PAIN for 3 Days, #12 TAB 0 Refills


   Prov: GIANNI BALDERRAMA APRN         12/1/20











GIANNI BALDERRAMA APRN        Dec 1, 2020 20:59

## 2020-12-01 NOTE — RAD
Exam: Pelvis with bilateral hips

 

INDICATION: Left hip pain,

 

TECHNIQUE: Frontal view of the pelvis with bilateral frog-leg lateral 

views of the left hip

 

Comparisons: None

 

FINDINGS:

Heterogenous sclerotic area within the left iliac bone. Bone 

mineralization is otherwise normal. No acute or healed fractures. Soft 

tissues are unremarkable.

 

IMPRESSION:

Sclerotic expansile bubbly lesion within the left iliac bone which is 

nonspecific could relate to fibrous dysplasia versus aneurysmal bone cyst.

Recommend correlation with nonemergent MRI of the pelvis without and with 

contrast to exclude other etiologies.

 

Electronically signed by: Doron Saucedo MD (12/1/2020 8:34 PM) [facility]

## 2020-12-06 ENCOUNTER — HOSPITAL ENCOUNTER (EMERGENCY)
Dept: HOSPITAL 63 - ER | Age: 20
Discharge: HOME | End: 2020-12-06
Payer: COMMERCIAL

## 2020-12-06 VITALS — HEIGHT: 71 IN | BODY MASS INDEX: 29.48 KG/M2 | WEIGHT: 210.54 LBS

## 2020-12-06 VITALS — DIASTOLIC BLOOD PRESSURE: 87 MMHG | SYSTOLIC BLOOD PRESSURE: 128 MMHG

## 2020-12-06 DIAGNOSIS — N19: ICD-10-CM

## 2020-12-06 DIAGNOSIS — Z87.891: ICD-10-CM

## 2020-12-06 DIAGNOSIS — M25.552: Primary | ICD-10-CM

## 2020-12-06 PROCEDURE — 99281 EMR DPT VST MAYX REQ PHY/QHP: CPT

## 2020-12-06 NOTE — PHYS DOC
Past History


Past Medical History:  Renal Failure, Other


Additional Past Medical Histor:  kidney failure -age [age]; cyst on rt hip


Past Surgical History:  Other


Additional Past Surgical Histo:  EYE, CYST REMOVED FROM HIP


Smoking:  Quit Greater Than 1 Year


Alcohol Use:  Occasionally


Drug Use:  None





Adult General


Chief Complaint


Chief Complaint:  HIP PAIN





HPI


HPI





Patient is a 20-year-old male presenting for work night.  He was recently seen 

at our facility and found to have left hip bony abnormality on radiograph 

imaging.  He has history of cysts on this hip that were removed at 14 years of 

age.  He was subsequently prescribed narcotic pain medication with instructions 

to follow-up in outpatient setting for nonemergent MRI.  He has set MRI 

scheduled at [facility] later this week; however, he works at local [employer] and reports 

his boss has been giving him a hard time and not adhering to advised physical 

restrictions.  Patient is here today asking for work note discussing specific 

light duty and stair/step limit as this exacerbates his pain more than anything





Review of Systems


Review of Systems


Fourteen body systems of review of systems have been reviewed. See HPI for 

pertinent positives and negative responses, other wise all other systems are 

negative, non-pertinent or non-contributory





Allergies


Allergies





Allergies








Coded Allergies Type Severity Reaction Last Updated Verified


 


  No Known Drug Allergies    10/17/20 No











Physical Exam


Physical Exam


Constitutional: Well developed, well nourished, no acute distress, non-toxic 

appearance. 


HENT: Normocephalic, atraumatic, bilateral external ears normal, oropharynx 

moist, no oral exudates, nose normal. 


Eyes: PERRLA, EOMI, conjunctiva normal, no discharge.  


Neck: Normal range of motion, no tenderness, supple, no stridor.  


Cardiovascular: Heart rate regular per monitor


Lungs & Thorax: Bilateral chest rise without obvious respiratory distress


Abdomen: Bowel sounds normal, soft, no tenderness, no masses, no pulsatile 

masses.  Nonsurgical abdomen, no peritoneal signs


Skin: Warm, dry, no erythema, no rash.  


Back: No tenderness, no CVA tenderness.  


Extremities: No cyanosis, no clubbing, no edema.  


Neurologic: Alert and oriented X 3, grossly normal motor & sensory function, no 

focal deficits noted. 


Psychologic: Affect normal, judgement normal, mood normal.





Current Patient Data


Vital Signs





                                   Vital Signs








  Date Time  Temp Pulse Resp B/P (MAP) Pulse Ox O2 Delivery O2 Flow Rate FiO2


 


12/6/20 09:00 97.7 76 16 128/87 (101) 100   











EKG


EKG


[]





Radiology/Procedures


Radiology/Procedures





Exam: Pelvis with bilateral hips


 


INDICATION: Left hip pain,


 


TECHNIQUE: Frontal view of the pelvis with bilateral frog-leg lateral 


views of the left hip


 


Comparisons: None


 


FINDINGS:


Heterogenous sclerotic area within the left iliac bone. Bone 


mineralization is otherwise normal. No acute or healed fractures. Soft 


tissues are unremarkable.


 


IMPRESSION:


Sclerotic expansile bubbly lesion within the left iliac bone which is 


nonspecific could relate to fibrous dysplasia versus aneurysmal bone cyst.


Recommend correlation with nonemergent MRI of the pelvis without and with 


contrast to exclude other etiologies.


 


Electronically signed by: Doron Saucedo MD (12/1/2020 8:34 PM) [facility]





Heart Score


Risk Factors:


Risk Factors:  DM, Current or recent (<one month) smoker, HTN, HLP, family 

history of CAD, obesity.


Risk Scores:


Risk Factors:  DM, Current or recent (<one month) smoker, HTN, HLP, family 

history of CAD, obesity.





Course & Med Decision Making


Course & Med Decision Making


ABCs unremarkable


Prior imaging studies confirmed bony abnormality to left hip likely causing his 

ongoing hip pain.  He is pending MRI as recommended in outpatient setting at 

[facility] later this week.  His pain is adequately controlled in outpatient setting. 

 


He is requesting light duty restriction at work which I feel is appropriate, I 

have written him a work note stating he would benefit from light duty at work 

with limited stair/step restriction until cleared by [facility] physician


Patient was advised and educated on importance of close outpatient follow-up.  I

 advised them to call primary care physician as soon as possible to discuss 

following up in outpatient setting  after ER departure for repeat examination 

and evaluation.





Strict return precautions were discussed at length with good understanding by 

patient who is able to restate plan and concerning signs or symptoms that should

 prompt immediate medical attention.  All questions and concerns addressed prior

 to ER departure





Lg Disclaimer


Dragon Disclaimer


This electronic medical record was generated, in whole or in part, using a voice

 recognition dictation system.





Departure


Departure:


Impression:  


   Primary Impression:  


   Left hip pain


Disposition:  01 DC HOME SELF CARE/HOMELESS


Condition:  STABLE


Referrals:  


PCP,NO (PCP)


Patient Instructions:  Hip Exercises, Generic, SportsMed, Hip Pain





Additional Instructions:  


As instructed prior to ER departure, please continue outpatient follow-up for 

your ongoing left hip issues as recommended with [facility] for nonemergent MRI


Continue previously prescribed pain medications and adhere to ongoing supportive

 care practices such as stretching


If any concerning signs or symptoms present prior to outpatient follow-up please

 do not hesitate to come back for repeat evaluation


It was a pleasure to take care of you today and I wish you a speedy recovery!











JAS GALLO DO                  Dec 6, 2020 09:18

## 2021-10-22 ENCOUNTER — HOSPITAL ENCOUNTER (EMERGENCY)
Dept: HOSPITAL 63 - ER | Age: 21
Discharge: HOME | End: 2021-10-22
Payer: COMMERCIAL

## 2021-10-22 VITALS — WEIGHT: 210.54 LBS | BODY MASS INDEX: 29.48 KG/M2 | HEIGHT: 71 IN

## 2021-10-22 VITALS — SYSTOLIC BLOOD PRESSURE: 128 MMHG | DIASTOLIC BLOOD PRESSURE: 87 MMHG

## 2021-10-22 DIAGNOSIS — Z87.891: ICD-10-CM
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
## 2021-10-22 DIAGNOSIS — M25.551: Primary | ICD-10-CM
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
## 2021-10-22 PROCEDURE — 72192 CT PELVIS W/O DYE: CPT

## 2021-10-22 PROCEDURE — 72131 CT LUMBAR SPINE W/O DYE: CPT

## 2021-10-22 PROCEDURE — 73552 X-RAY EXAM OF FEMUR 2/>: CPT

## 2021-10-22 NOTE — PHYS DOC
Past History


Past Medical History:  Renal Failure, Other


Additional Past Medical Histor:  kidney failure -age [age]; cyst on rt hip


Past Surgical History:  Other


Additional Past Surgical Histo:  EYE, CYST REMOVED FROM HIP


Smoking:  Quit Greater Than 1 Year


Alcohol Use:  Occasionally


Drug Use:  None





General Adult


EDM:


Chief Complaint:  HIP PAIN





HPI:


HPI:


"..I was on a ladder doing stocking..  and fell...'."





Patient is a 21 year old male who presents with above hx of fall from ladder 

with Rt. hip pain after the fall.    Pt. localizes pain in Rt. hip.  Pt. does 

have past history of bone cyst.  Patient currently works at [employer]  and injury 

occurred approximately 6 AM this morning.   No recent travel.  No sick ill 

contacts.  No history of immunosuppression.  Is a work comp injury.  Patient 

states he did inform his employer of the injury.





Review of Systems:


Review of Systems:


Constitutional:  Denies fever or chills 


Eyes:  Denies change in visual acuity 


HENT:  Denies nasal congestion or sore throat 


Respiratory:  Denies cough or shortness of breath 


Cardiovascular:  Denies chest pain or edema 


GI:  Denies abdominal pain, nausea, vomiting, bloody stools or diarrhea 


: Denies dysuria 


Musculoskeletal: Complains of right hip pain


Integument:  Denies rash 


Neurologic:  Denies headache, focal weakness or sensory changes 


Endocrine:  Denies polyuria or polydipsia 


Lymphatic:  Denies swollen glands 


Psychiatric:  Denies depression or anxiety





Family History:


Family History:


Noncontributory presentation





Current Medications:


Current Meds:


See nursing home meds





Allergies:


Allergies:





Allergies








Coded Allergies Type Severity Reaction Last Updated Verified


 


  No Known Drug Allergies    10/17/20 No











Physical Exam:


PE:





Constitutional: Well developed, well nourished, moderate acute distress, non-

toxic appearance. []


HENT: Normocephalic, atraumatic, bilateral external ears normal, oropharynx 

moist, no oral exudates, nose normal. []


Eyes: PERRLA, EOMI, conjunctiva normal, no discharge. [] 


Neck: Normal range of motion, no tenderness, supple, no stridor. [] 


Cardiovascular:Heart rate regular rhythm, no murmur []


Lungs & Thorax:  Bilateral breath sounds clear to auscultation []


Abdomen: Bowel sounds normal, soft, no tenderness, no masses, no pulsatile 

masses. [] 


Skin: Warm, dry, no erythema, no rash. [] 


Back: No tenderness, no CVA tenderness. [] 


Extremities: Right hip tenderness, no cyanosis, no clubbing, ROM intact, no 

edema. [] 


Neurologic: Alert and oriented X 3, normal motor function, normal sensory 

function, no focal deficits noted. []


Psychologic: Affect anxious, judgement normal, mood normal. []





EKG:


EKG:


[]





Radiology/Procedures:


Radiology/Procedures:


[facility] [address], [place] [address] [phone]


                                        


                                 IMAGING REPORT





                                     Signed





PATIENT: VENTURA MEJIA   ACCOUNT: [id number]     MRN#: [id number]


: 2000           LOCATION: ER              AGE: 21


SEX: M                    EXAM DT: 10/22/21         ACCESSION#: [id number]


STATUS: REG ER            ORD. PHYSICIAN: JANAE GURROLA MD


REASON: FALL, HX LEFT HIP CYSTS


PROCEDURE: CT PELVIS WO CONTRAST





EXAM: 


CT lumbar spine without IV contrast


CT pelvis without contrast


CLINICAL HISTORY:Reason: FALL, HX LEFT HIP CYSTS / Spl. Instructions:  / 

History: 





COMPARISON: None available.





TECHNIQUE: 


Helical CT was performed through the lumbar spine. Axial, coronal and sagittal 

reformatted images were generated. 


CT of the pelvis was also performed without IV contrast. Axial, coronal and 

sagittal reformatted images were generated.





PQRS compliance statement - One or more of the following individualized dose 

reduction techniques were utilized for this study:


1.  Automated exposure control


2.  Adjustment of the mA and/or kV according to patient size


3.  Use of iterative reconstruction technique





FINDINGS: 


Lumbar spine:


Bilateral L5 pars defects are seen. No spondylolisthesis. Generalized disc bulge

 L5-S1. No acute fracture. Straightening of the normal lumbar lordosis.





Pelvis:


Expansile lytic or sclerotic left iliac wing lesion is likely grossly stable.





IMPRESSION:


1.  No acute fracture or subluxation of the lumbar spine


2.  Bilateral L5 pars defects are seen, without spondylolisthesis.


3.  No acute pelvic fracture.


4.  Left iliac wing lytic and sclerotic lesion, without definite aggressive 

characteristics. Follow-up radiographs in 6-12 months is recommended to ensure 

stability.





Electronically signed by: Guilherme Berg MD (10/22/2021 10:19 PM) [facility]














DICTATED AND SIGNED BY:     GUILHERME BERG MD


DATE:     10/22/21 2214





CC: JANAE GURRLOA MD; PCP,NO ~[facility] [address], [place] [address] [phone]


                                        


                                 IMAGING REPORT





                                     Signed





PATIENT: VENTURA MEJIA   ACCOUNT: [id number]     MRN#: [id number]


: 2000           LOCATION: ER              AGE: 21


SEX: M                    EXAM DT: 10/22/21         ACCESSION#: [id number]


STATUS: REG ER            ORD. PHYSICIAN: JANAE GURROLA MD


REASON: pain, fall


PROCEDURE: RIGHT FEMUR XRAY





EXAM:  AP and lateral views right femur





DATE: 10/22/2021 9:36 PM





INDICATION: Reason: pain, fall / Spl. Instructions:  / History: .





COMPARISON: No Prior





FINDINGS/


IMPRESSION:


No evidence of acute fracture or dislocation. Joint spaces are preserved without

 significant degenerative/proliferative change.





Electronically signed by: Guilherme Berg MD (10/22/2021 10:21 PM) [facility]














DICTATED AND SIGNED BY:     GUILHERME BERG MD


DATE:     10/22/21 2220





CC: JANAE GURROLA MD; PCP,NO ~MTH0 0


[][facility] [address], [place] [address] [phone]


                                        


                                 IMAGING REPORT





                                     Signed





PATIENT: VENTURA MEJIA   ACCOUNT: [id number]     MRN#: [id number]


: 2000           LOCATION: ER              AGE: 21


SEX: M                    EXAM DT: 10/22/21         ACCESSION#: [id number]


STATUS: REG ER            ORD. PHYSICIAN: JANAE GURROLA MD


REASON: fall from ladder, 


PROCEDURE: CT LUMBAR SPINE WO CONTRAST





EXAM: 


CT lumbar spine without IV contrast


CT pelvis without contrast


CLINICAL HISTORY:Reason: FALL, HX LEFT HIP CYSTS / Spl. Instructions:  / 

History: 





COMPARISON: None available.





TECHNIQUE: 


Helical CT was performed through the lumbar spine. Axial, coronal and sagittal 

reformatted images were generated. 


CT of the pelvis was also performed without IV contrast. Axial, coronal and 

sagittal reformatted images were generated.





PQRS compliance statement - One or more of the following individualized dose 

reduction techniques were utilized for this study:


1.  Automated exposure control


2.  Adjustment of the mA and/or kV according to patient size


3.  Use of iterative reconstruction technique





FINDINGS: 


Lumbar spine:


Bilateral L5 pars defects are seen. No spondylolisthesis. Generalized disc bulge

 L5-S1. No acute fracture. Straightening of the normal lumbar lordosis.





Pelvis:


Expansile lytic or sclerotic left iliac wing lesion is likely grossly stable.





IMPRESSION:


1.  No acute fracture or subluxation of the lumbar spine


2.  Bilateral L5 pars defects are seen, without spondylolisthesis.


3.  No acute pelvic fracture.


4.  Left iliac wing lytic and sclerotic lesion, without definite aggressive 

characteristics. Follow-up radiographs in 6-12 months is recommended to ensure 

stability.





Electronically signed by: Guilherme Berg MD (10/22/2021 10:19 PM) [facility]














DICTATED AND SIGNED BY:     GUILHERME BERG MD


DATE:     10/22/21 2214





CC: JANAE GURROLA MD; PCP,FARAZ ~MTH0 0





Heart Score:


C/O Chest Pain:  N/A


Risk Factors:


Risk Factors:  DM, Current or recent (<one month) smoker, HTN, HLP, family 

history of CAD, obesity.


Risk Scores:


Score 0 - 3:  2.5% MACE over next 6 weeks - Discharge Home


Score 4 - 6:  20.3% MACE over next 6 weeks - Admit for Clinical Observation


Score 7 - 10:  72.7% MACE over next 6 weeks - Early Invasive Strategies





Course & Med Decision Making:


Course & Med Decision Making


Pertinent Labs and Imaging studies reviewed. (See chart for details)





Patient follow-up workman comp.  Consider russell-ray in 2 weeks if no improvement. 

 Does have a history of bone cyst.,  Initial fracture may not be observable.  

Take Tylenol or Profen for pain.  Follow-up primary care.  Work on follow-up 

essential.





Impression:





1.  Fall from ladder-approximately 6 foot


2.  Right hip pain


3.  History of bone cysts





[]





Dragon Disclaimer:


Dragon Disclaimer:


This electronic medical record was generated, in whole or in part, using a voice

 recognition dictation system.





Departure


Departure:


Referrals:  


[facility] (PCP)





Dragon Disclaimer


This chart was dictated in whole or in part using Voice Recognition software in 

a busy, high-work load, and often noisy Emergency Department environment.  It 

may contain unintended and wholly unrecognized errors or omissions.











JANAE GURROLA MD           Oct 22, 2021 21:37

## 2021-10-22 NOTE — RAD
EXAM: 

CT lumbar spine without IV contrast

CT pelvis without contrast

CLINICAL HISTORY:Reason: FALL, HX LEFT HIP CYSTS / Spl. Instructions:  / History: 



COMPARISON: None available.



TECHNIQUE: 

Helical CT was performed through the lumbar spine. Axial, coronal and sagittal reformatted images wer
e generated. 

CT of the pelvis was also performed without IV contrast. Axial, coronal and sagittal reformatted imag
es were generated.



PQRS compliance statement - One or more of the following individualized dose reduction techniques wer
e utilized for this study:

1.  Automated exposure control

2.  Adjustment of the mA and/or kV according to patient size

3.  Use of iterative reconstruction technique



FINDINGS: 

Lumbar spine:

Bilateral L5 pars defects are seen. No spondylolisthesis. Generalized disc bulge L5-S1. No acute frac
ture. Straightening of the normal lumbar lordosis.



Pelvis:

Expansile lytic or sclerotic left iliac wing lesion is likely grossly stable.



IMPRESSION:

1.  No acute fracture or subluxation of the lumbar spine

2.  Bilateral L5 pars defects are seen, without spondylolisthesis.

3.  No acute pelvic fracture.

4.  Left iliac wing lytic and sclerotic lesion, without definite aggressive characteristics. Follow-u
p radiographs in 6-12 months is recommended to ensure stability.



Electronically signed by: Guilherme Berg MD (10/22/2021 10:19 PM) [facility]

## 2021-10-22 NOTE — RAD
EXAM:  AP and lateral views right femur



DATE: 10/22/2021 9:36 PM



INDICATION: Reason: pain, fall / Spl. Instructions:  / History: .



COMPARISON: No Prior



FINDINGS/

IMPRESSION:

No evidence of acute fracture or dislocation. Joint spaces are preserved without significant degenera
tive/proliferative change.



Electronically signed by: Guilherme Berg MD (10/22/2021 10:21 PM) [facility]

## 2022-04-17 ENCOUNTER — HOSPITAL ENCOUNTER (EMERGENCY)
Dept: HOSPITAL 63 - ER | Age: 22
Discharge: HOME | End: 2022-04-17
Payer: COMMERCIAL

## 2022-04-17 VITALS — HEIGHT: 71 IN | BODY MASS INDEX: 29.48 KG/M2 | WEIGHT: 210.54 LBS

## 2022-04-17 VITALS — SYSTOLIC BLOOD PRESSURE: 156 MMHG | DIASTOLIC BLOOD PRESSURE: 74 MMHG

## 2022-04-17 DIAGNOSIS — N19: ICD-10-CM

## 2022-04-17 DIAGNOSIS — R07.2: Primary | ICD-10-CM

## 2022-04-17 DIAGNOSIS — Z87.891: ICD-10-CM

## 2022-04-17 PROCEDURE — 99285 EMERGENCY DEPT VISIT HI MDM: CPT

## 2022-04-17 PROCEDURE — 36415 COLL VENOUS BLD VENIPUNCTURE: CPT

## 2022-04-17 PROCEDURE — 93005 ELECTROCARDIOGRAM TRACING: CPT

## 2022-04-17 PROCEDURE — 71045 X-RAY EXAM CHEST 1 VIEW: CPT

## 2022-04-17 PROCEDURE — 84484 ASSAY OF TROPONIN QUANT: CPT

## 2022-04-17 NOTE — PHYS DOC
Past History


Past Medical History:  Renal Failure, Other


Additional Past Medical Histor:  kidney failure -age [age]; cyst on rt hip


Past Surgical History:  Other


Additional Past Surgical Histo:  EYE, CYST REMOVED FROM HIP


Smoking:  Quit Greater Than 1 Year


Alcohol Use:  None


Drug Use:  None





Adult General


Chief Complaint


Chief Complaint:  CHEST PAIN





HPI


HPI


Patient is an otherwise healthy 21-year-old male who presents with a chief 

complaint of sharp, substernal chest pain, intermittent is been going on for the

last couple of days.  States that 6 out of 10 at its worst.  Denies any known 

aggravating or alleviating factors.  States he was at work earlier tonight and 

started having the same sharp chest pain and asked his supervisor if he could 

leave and go to the emergency department.  States that the pain has been gone 

for some time now and has no other symptoms currently.  Denies any recent 

travels, traumas, illnesses, fevers, shortness of breath, abdominal pain, 

nausea, vomiting, diarrhea, dysuria, hematuria or blood in the stool.  Denies 

any numbness/weakness/tingling.  States he has been eating and drinking normally

for him.  States he is making urine and stool normally for him.  Denies any 

alcohol or drug use.





Review of Systems


Review of Systems


Review of systems otherwise unremarkable except noted in HPI





Allergies


Allergies





Allergies








Coded Allergies Type Severity Reaction Last Updated Verified


 


  No Known Drug Allergies    10/17/20 No











Physical Exam


Physical Exam





Constitutional: Well developed, well nourished, no acute distress, non-toxic 

appearance. []


HENT: Normocephalic, atraumatic, bilateral external ears normal, oropharynx 

moist, no oral exudates, nose normal. []


Eyes: conjunctiva normal, no discharge. [] 


Neck: Normal range of motion, no tenderness, supple, no stridor. [] 


Cardiovascular:Heart rate regular rhythm, no murmur []


Lungs & Thorax:  Bilateral breath sounds clear to auscultation []


Abdomen: soft, no tenderness, no masses, no pulsatile masses. [] 


Skin: Warm, dry, no erythema, no rash. [] 


Back: No tenderness, no CVA tenderness. [] 


Extremities: No tenderness, no cyanosis, no clubbing, ROM intact, no edema. [] 


Neurologic: Alert and oriented X 3, normal motor function, normal sensory 

function, able to sit, stand and walk without issue, no focal deficits noted. []


Psychologic: Affect normal, judgement normal, mood normal. []





EKG


EKG


[]





Radiology/Procedures


Radiology/Procedures


[]





Heart Score


C/O Chest Pain:  Yes


HEART Score for Chest Pain:  








HEART Score for Chest Pain Response (Comments) Value


 


History Slighlty/Non-Suspicious 0


 


ECG Normal 0


 


Age < 45 0


 


Risk Factors No Risk Factors 0


 


Troponin < Normal Limit 0


 


Total  0








Risk Factors:


Risk Factors:  DM, Current or recent (<one month) smoker, HTN, HLP, family 

history of CAD, obesity.


Risk Scores:


Risk Factors:  DM, Current or recent (<one month) smoker, HTN, HLP, family 

history of CAD, obesity.





Course & Med Decision Making


Course & Med Decision Making


Patient is a 21-year-old male who presents with sharp chest pain


Vital signs notable for hypertension.  Physical exam noted above.  EKG with a 

rate of 82, QRS of 94, QTc of 391, no STEMI.  Troponin normal.  Chest x-ray not 

concerning.  Low risk Wells.  PERC negative.


Discussed all findings with patient.  Discussed differential diagnosis of chest 

pain.  Advised to follow-up as soon as he can with his primary care physician 

update on ED visit and set up a follow-up visit to discuss need for outpatient 

stress test.


Gave strict return precautions to the ED.  Patient grateful, verbalized 

understanding and agreed with plan of discharge.





[]





Dragon Disclaimer


Dragon Disclaimer


This electronic medical record was generated, in whole or in part, using a voice

 recognition dictation system.





Departure


Departure:


Impression:  


   Primary Impression:  


   Chest pain


Disposition:  01 HOME / SELF CARE / HOMELESS


Condition:  STABLE


Referrals:  


PCP,NO (PCP)








CHENCHO MALIK MD


Patient Instructions:  Chest Pain (Nonspecific)





Additional Instructions:  


Thank you for coming into the emergency department tonight and allowing us to 

take care of you.  Please read the attached information carefully go over things

 we discussed.  It is very important that you follow-up with your primary care 

physician soon as possible to update on your ED visit and discuss need for 

further evaluation and treatment including a stress test and evaluation for 

hypertension as we discussed.  Please come back to the emergency department 

immediately with new or concerning symptoms as we discussed.











RUPERT WOODS MD               Apr 17, 2022 02:36

## 2022-04-17 NOTE — RAD
AP chest x-ray



HISTORY: Chest pain.



COMPARISON: Chest x-ray November 8, 2019



FINDINGS: Cardiac and mediastinal silhouette are normal. No pneumothorax, pulmonary opacities or pleu
ral effusions. Bones are unremarkable.



IMPRESSION: No acute process.



Electronically signed by: Rocky Vang MD (4/17/2022 2:48 AM) [facility]

## 2022-04-18 NOTE — EKG
[facility] [address], [place] [address]

Test Date:    2022               Test Time:    02:25:52

Pat Name:     VENTURA MEJIA             Department:   

Patient ID:   [id number]           Room:          

Gender:       M                        Technician:   SHAVONNE

:          2000               Requested By: RUPERT WOODS

Order Number: [id number]            Reading MD:   Abimael Lima

                                 Measurements

Intervals                              Axis          

Rate:         82                       P:            29

VA:           166                      QRS:          74

QRSD:         94                       T:            41

QT:           332                                    

QTc:          391                                    

                           Interpretive Statements

SINUS RHYTHM

Electronically Signed On 2022 18:32:10 CDT by Abimael Lima

## 2022-04-21 ENCOUNTER — HOSPITAL ENCOUNTER (EMERGENCY)
Dept: HOSPITAL 63 - ER | Age: 22
Discharge: HOME | End: 2022-04-21
Payer: COMMERCIAL

## 2022-04-21 VITALS — SYSTOLIC BLOOD PRESSURE: 146 MMHG | DIASTOLIC BLOOD PRESSURE: 86 MMHG

## 2022-04-21 VITALS — HEIGHT: 71 IN | BODY MASS INDEX: 29.48 KG/M2 | WEIGHT: 210.54 LBS

## 2022-04-21 DIAGNOSIS — M54.6: ICD-10-CM

## 2022-04-21 DIAGNOSIS — N19: ICD-10-CM

## 2022-04-21 DIAGNOSIS — Z87.891: ICD-10-CM

## 2022-04-21 DIAGNOSIS — R07.89: ICD-10-CM

## 2022-04-21 DIAGNOSIS — R55: ICD-10-CM

## 2022-04-21 DIAGNOSIS — G93.0: Primary | ICD-10-CM

## 2022-04-21 LAB
ALBUMIN SERPL-MCNC: 4 G/DL (ref 3.4–5)
ALBUMIN/GLOB SERPL: 1.1 {RATIO} (ref 1–1.7)
ALP SERPL-CCNC: 86 U/L (ref 46–116)
ALT SERPL-CCNC: 43 U/L (ref 16–63)
ANION GAP SERPL CALC-SCNC: 7 MMOL/L (ref 6–14)
AST SERPL-CCNC: 18 U/L (ref 15–37)
BASOPHILS # BLD AUTO: 0 X10^3/UL (ref 0–0.2)
BASOPHILS NFR BLD: 1 % (ref 0–3)
BILIRUB SERPL-MCNC: 0.7 MG/DL (ref 0.2–1)
BUN/CREAT SERPL: 14 (ref 6–20)
CA-I SERPL ISE-MCNC: 14 MG/DL (ref 8–26)
CALCIUM SERPL-MCNC: 9.1 MG/DL (ref 8.5–10.1)
CHLORIDE SERPL-SCNC: 105 MMOL/L (ref 98–107)
CO2 SERPL-SCNC: 25 MMOL/L (ref 21–32)
CREAT SERPL-MCNC: 1 MG/DL (ref 0.7–1.3)
EOSINOPHIL NFR BLD: 0 % (ref 0–3)
EOSINOPHIL NFR BLD: 0 X10^3/UL (ref 0–0.7)
ERYTHROCYTE [DISTWIDTH] IN BLOOD BY AUTOMATED COUNT: 12.8 % (ref 11.5–14.5)
GFR SERPLBLD BASED ON 1.73 SQ M-ARVRAT: 92.6 ML/MIN
GLOBULIN SER-MCNC: 3.5 G/DL (ref 2.2–3.8)
GLUCOSE SERPL-MCNC: 86 MG/DL (ref 70–99)
HCT VFR BLD CALC: 44.1 % (ref 39–53)
HGB BLD-MCNC: 14.9 G/DL (ref 13–17.5)
LYMPHOCYTES # BLD: 2 X10^3/UL (ref 1–4.8)
LYMPHOCYTES NFR BLD AUTO: 23 % (ref 24–48)
MCH RBC QN AUTO: 32 PG (ref 25–35)
MCHC RBC AUTO-ENTMCNC: 34 G/DL (ref 31–37)
MCV RBC AUTO: 94 FL (ref 79–100)
MONO #: 0.5 X10^3/UL (ref 0–1.1)
MONOCYTES NFR BLD: 6 % (ref 0–9)
NEUT #: 6.1 X10^3UL (ref 1.8–7.7)
NEUTROPHILS NFR BLD AUTO: 71 % (ref 31–73)
PLATELET # BLD AUTO: 227 X10^3/UL (ref 140–400)
POTASSIUM SERPL-SCNC: 3.6 MMOL/L (ref 3.5–5.1)
PROT SERPL-MCNC: 7.5 G/DL (ref 6.4–8.2)
RBC # BLD AUTO: 4.68 X10^6/UL (ref 4.3–5.7)
SODIUM SERPL-SCNC: 137 MMOL/L (ref 136–145)
WBC # BLD AUTO: 8.6 X10^3/UL (ref 4–11)

## 2022-04-21 PROCEDURE — 85025 COMPLETE CBC W/AUTO DIFF WBC: CPT

## 2022-04-21 PROCEDURE — 93005 ELECTROCARDIOGRAM TRACING: CPT

## 2022-04-21 PROCEDURE — 36415 COLL VENOUS BLD VENIPUNCTURE: CPT

## 2022-04-21 PROCEDURE — 84484 ASSAY OF TROPONIN QUANT: CPT

## 2022-04-21 PROCEDURE — 80053 COMPREHEN METABOLIC PANEL: CPT

## 2022-04-21 PROCEDURE — 99285 EMERGENCY DEPT VISIT HI MDM: CPT

## 2022-04-21 PROCEDURE — 96374 THER/PROPH/DIAG INJ IV PUSH: CPT

## 2022-04-21 PROCEDURE — 71045 X-RAY EXAM CHEST 1 VIEW: CPT

## 2022-04-21 PROCEDURE — 96361 HYDRATE IV INFUSION ADD-ON: CPT

## 2022-04-21 PROCEDURE — 70450 CT HEAD/BRAIN W/O DYE: CPT

## 2022-04-21 PROCEDURE — 96375 TX/PRO/DX INJ NEW DRUG ADDON: CPT

## 2022-04-21 NOTE — RAD
Exam Date:  4/21/2022 4:56 PM



XR CHEST 1V



Indication: Reason: PER PT HAVING CP, HE PASSED OUT TODAY / Spl. Instructions:  / History: .



Comparison: April 17, 2022



FINDINGS/

IMPRESSION:





The cardiac silhouette and pulmonary vasculature are within normal limits.  

There is no focal consolidation, pleural effusion or pneumothorax.  

The visualized osseous structures are intact.





Electronically signed by: Nick Sutherland MD (4/21/2022 4:57 PM) [id number]

## 2022-04-21 NOTE — PHYS DOC
Past History


Past Medical History:  Renal Failure, Other


Additional Past Medical Histor:  kidney failure -age 13-16; cyst on rt hip


Past Surgical History:  Other


Additional Past Surgical Histo:  EYE, CYST REMOVED FROM HIP


Smoking:  Quit Greater Than 1 Year


Alcohol Use:  Rarely


Drug Use:  None





General Adult


EDM:


Chief Complaint:  CHEST PAIN





HPI:


HPI:


21-year-old male presents with chest pain.  The patient was recently seen in 

this emergency room by my colleague.  His work-up at that time was negative.  He

comes back today because he woke up this morning around 8 AM with central chest 

pain that radiates through to his back.  He also feels like he has a weird 

sensation in his left arm and heaviness in his legs.  His chest pain is 

currently an 8 out of 10 and sharp stabbing in the center chest.  He was dealing

with the pain at home and had these 1 syncopal episode.  He does not remember 

what happened but his relatives told him he passed out while sitting on the 

couch.  He has had syncopal episodes in the past without diagnosis.  He has 

never seen a neurologist.  He did not have a head CT with his previous ER visit.

 Patient denies fever or chills.  He has a cardiology appointment in 4 days.





Review of Systems:


Review of Systems:


Constitutional:  Denies fever or chills 


Eyes:  Denies change in visual acuity 


HENT:  Denies nasal congestion or sore throat 


Respiratory:  Denies cough or shortness of breath 


Cardiovascular: Chest pain


GI:  Denies abdominal pain, nausea, vomiting, bloody stools or diarrhea 


: Denies dysuria 


Musculoskeletal: Thoracic back pain


Integument:  Denies rash 


Neurologic:  Denies headache, focal weakness or sensory changes 


Endocrine:  Denies polyuria or polydipsia 


Lymphatic:  Denies swollen glands 


Psychiatric:  Denies depression or anxiety





Current Medications:


Current Meds:





Current Medications








 Medications


  (Trade)  Dose


 Ordered  Sig/Ashley  Start Time


 Stop Time Status Last Admin


Dose Admin


 


 Famotidine


  (Pepcid Vial)  20 mg  1X  ONCE  4/21/22 17:00


 4/21/22 17:01 UNV  





 


 Morphine Sulfate


  (Morphine 4mg


 Syringe)  4 mg  1X  ONCE  4/21/22 17:00


 4/21/22 17:01 UNV  





 


 Ondansetron HCl


  (Zofran)  4 mg  1X  ONCE  4/21/22 17:00


 4/21/22 17:01 UNV  





 


 Sodium Chloride  1,000 ml @ 


 1,000 mls/hr  1X  ONCE  4/21/22 17:00


 4/21/22 17:59 UNV  














Allergies:


Allergies:





Allergies








Coded Allergies Type Severity Reaction Last Updated Verified


 


  No Known Drug Allergies    10/17/20 No











Physical Exam:


PE:





Constitutional: Well developed, well nourished, no acute distress, non-toxic 

appearance. []


HENT: Normocephalic, atraumatic, bilateral external ears normal, oropharynx 

moist, no oral exudates, nose normal. []


Eyes: PERRLA, EOMI, conjunctiva normal, no discharge. [] 


Neck: Normal range of motion, no tenderness, supple, no stridor. [] 


Cardiovascular: Heart rate 80, regular rhythm, no murmur []


Lungs & Thorax:  Bilateral breath sounds clear to auscultation []


Abdomen: Bowel sounds normal, soft, no tenderness, no masses, no pulsatile 

masses. [] 


Skin: Warm, dry, no erythema, no rash. [] 


Back: No tenderness, no CVA tenderness. [] 


Extremities: No tenderness, no cyanosis, no clubbing, ROM intact, no edema. [] 


Neurologic: Alert and oriented X 3, normal motor function, normal sensory 

function, no focal deficits noted. []


Psychologic: Affect normal, judgement normal, mood anxious. []





EKG:


EKG:


Sinus rhythm, rate 80, normal axis, no ST elevation or depression.  []





Radiology/Procedures:


Radiology/Procedures:


[]


Impressions:


CT HEAD/BRAIN WO





History: Reason: syncope, CP, CONFUSION / Spl. Instructions:  / History: 





Comparison: None.





Technique: Noncontrast CT imaging was performed of the head.  





Exposure: One or more of the following individualized dose reduction techniques 

were utilized for this examination:  


1. Automated exposure control  


2. Adjustment of the mA and/or kV according to patient size  


3. Use of iterative reconstruction technique.





Findings: 


No intracranial hemorrhage. No mass effect. No hydrocephalus.  





Cystic lesion along the medial left temporal lobe measures 2.2 x 2.5 cm. The 

lesion abuts the left hippocampus.





Imaged orbits are unremarkable. Scattered paranasal sinus mucosal thickening 

most prominent in the right sphenoid sinus. Mastoid air cells are clear. No 

acute calvarial fracture.





Impression:


1.  No acute intracranial abnormality.


2.  Cystic lesion along the left medial temporal lobe, may represent arachnoid 

or epidermoid cyst. MRI with and without contrast can further assess.





Electronically signed by: Devin Silvestre DO (4/21/2022 5:39 PM) [facility]














DICTATED AND SIGNED BY:     DEVIN SILVESTRE DO


DATE:     04/21/22 1723





CC: JARROD SORTO DO; PCP,NO ~














Exam Date:  4/21/2022 4:56 PM





XR CHEST 1V





Indication: Reason: PER PT HAVING CP, HE PASSED OUT TODAY / Spl. Instructions:  

/ History: .





Comparison: April 17, 2022





FINDINGS/


IMPRESSION:








The cardiac silhouette and pulmonary vasculature are within normal limits.  


There is no focal consolidation, pleural effusion or pneumothorax.  


The visualized osseous structures are intact.








Electronically signed by: Traci Sutherland MD (4/21/2022 4:57 PM) [id number]














DICTATED AND SIGNED BY:     TRACI SUTHERLAND MD


DATE:     04/21/22 1657





CC: JARROD SORTO DO; PCP,NO ~





Heart Score:


C/O Chest Pain:  Yes


HEART Score for Chest Pain:  








HEART Score for Chest Pain Response (Comments) Value


 


History Slighlty/Non-Suspicious 0


 


ECG Normal 0


 


Age < 45 0


 


Risk Factors                            1 or 2 Risk Factors 1


 


Troponin < Normal Limit 0


 


Total  1








Risk Factors:


Risk Factors:  DM, Current or recent (<one month) smoker, HTN, HLP, family 

history of CAD, obesity.


Risk Scores:


Score 0 - 3:  2.5% MACE over next 6 weeks - Discharge Home


Score 4 - 6:  20.3% MACE over next 6 weeks - Admit for Clinical Observation


Score 7 - 10:  72.7% MACE over next 6 weeks - Early Invasive Strategies





Course & Med Decision Making:


Course & Med Decision Making


Pertinent Labs and Imaging studies reviewed. (See chart for details)


The patient's EKG is unremarkable.  As I listen to his story and previous 

history it makes me wonder about atypical seizure.  I have added a head CT to 

his work-up.  The patient's labs are unremarkable.  His troponin is negative.  

Chest x-ray is negative for acute findings.  The patient was given 4 mg of 

morphine for his pain and he got bradycardic and his blood pressure dropped.  We

 gave 0.4 of Narcan and he recovered well still having pain control.  The 

patient's head CT does show a 2.2 x 2.5 cm cystic lesion along the left temporal

 lobe.  See official read for more details.  The patient already has follow-up 

with cardiology on Monday.  I have also advised that he follow-up with his 

primary care physician and consider follow-up with neurology to further discuss 

his CT findings and further work-up.  He is stable for discharge at this time.


[]





Dragon Disclaimer:


Dragon Disclaimer:


This electronic medical record was generated, in whole or in part, using a voice

 recognition dictation system.





Departure


Departure:


Impression:  


   Primary Impression:  


   Chest pain


   Additional Impressions:  


   Syncope


   Cyst of brain


Disposition:  01 HOME / SELF CARE / HOMELESS


Condition:  STABLE


Referrals:  


PCP,NO (PCP)





Additional Instructions:  


You should follow-up with your primary care physician and tell them about your 

head CT.  You may or may not need further work-up and a consult with neurology.











JARROD SORTO DO                 Apr 21, 2022 17:09

## 2022-04-21 NOTE — RAD
CT HEAD/BRAIN WO



History: Reason: syncope, CP, CONFUSION / Spl. Instructions:  / History: 



Comparison: None.



Technique: Noncontrast CT imaging was performed of the head.  



Exposure: One or more of the following individualized dose reduction techniques were utilized for thi
s examination:  

1. Automated exposure control  

2. Adjustment of the mA and/or kV according to patient size  

3. Use of iterative reconstruction technique.



Findings: 

No intracranial hemorrhage. No mass effect. No hydrocephalus.  



Cystic lesion along the medial left temporal lobe measures 2.2 x 2.5 cm. The lesion abuts the left hi
ppocampus.



Imaged orbits are unremarkable. Scattered paranasal sinus mucosal thickening most prominent in the ri
ght sphenoid sinus. Mastoid air cells are clear. No acute calvarial fracture.



Impression:

1.  No acute intracranial abnormality.

2.  Cystic lesion along the left medial temporal lobe, may represent arachnoid or epidermoid cyst. MR
I with and without contrast can further assess.



Electronically signed by: Devin Silvestre DO (4/21/2022 5:39 PM) [facility]

## 2022-04-21 NOTE — EKG
[facility] [address], [place] [address]

Test Date:    2022               Test Time:    16:38:37

Pat Name:     VENTURA MEJIA             Department:   

Patient ID:   [id number]           Room:          

Gender:       M                        Technician:   

:          2000               Requested By: JARROD SORTO

Order Number: [id number]            Reading MD:     

                                 Measurements

Intervals                              Axis          

Rate:         80                       P:            40

AL:           180                      QRS:          46

QRSD:         98                       T:            34

QT:           332                                    

QTc:          386                                    

                           Interpretive Statements

SINUS RHYTHM

NORMAL ECG

RI6.01

No previous ECG available for comparison

## 2022-04-23 ENCOUNTER — HOSPITAL ENCOUNTER (EMERGENCY)
Dept: HOSPITAL 63 - ER | Age: 22
LOS: 1 days | Discharge: HOME | End: 2022-04-24
Payer: COMMERCIAL

## 2022-04-23 VITALS — BODY MASS INDEX: 29.48 KG/M2 | WEIGHT: 210.54 LBS | HEIGHT: 71 IN

## 2022-04-23 DIAGNOSIS — Z87.891: ICD-10-CM

## 2022-04-23 DIAGNOSIS — F41.9: ICD-10-CM

## 2022-04-23 DIAGNOSIS — R07.9: ICD-10-CM

## 2022-04-23 DIAGNOSIS — R41.0: Primary | ICD-10-CM

## 2022-04-23 DIAGNOSIS — R53.83: ICD-10-CM

## 2022-04-23 PROCEDURE — 82550 ASSAY OF CK (CPK): CPT

## 2022-04-23 PROCEDURE — 84484 ASSAY OF TROPONIN QUANT: CPT

## 2022-04-23 PROCEDURE — 83735 ASSAY OF MAGNESIUM: CPT

## 2022-04-23 PROCEDURE — 93005 ELECTROCARDIOGRAM TRACING: CPT

## 2022-04-23 PROCEDURE — 85379 FIBRIN DEGRADATION QUANT: CPT

## 2022-04-23 PROCEDURE — 85025 COMPLETE CBC W/AUTO DIFF WBC: CPT

## 2022-04-23 PROCEDURE — 80307 DRUG TEST PRSMV CHEM ANLYZR: CPT

## 2022-04-23 PROCEDURE — 84443 ASSAY THYROID STIM HORMONE: CPT

## 2022-04-23 PROCEDURE — 71045 X-RAY EXAM CHEST 1 VIEW: CPT

## 2022-04-23 PROCEDURE — 83880 ASSAY OF NATRIURETIC PEPTIDE: CPT

## 2022-04-23 PROCEDURE — 36415 COLL VENOUS BLD VENIPUNCTURE: CPT

## 2022-04-23 PROCEDURE — 85730 THROMBOPLASTIN TIME PARTIAL: CPT

## 2022-04-23 PROCEDURE — 87428 SARSCOV & INF VIR A&B AG IA: CPT

## 2022-04-23 PROCEDURE — 81001 URINALYSIS AUTO W/SCOPE: CPT

## 2022-04-23 PROCEDURE — 85610 PROTHROMBIN TIME: CPT

## 2022-04-23 PROCEDURE — 83690 ASSAY OF LIPASE: CPT

## 2022-04-23 PROCEDURE — 80048 BASIC METABOLIC PNL TOTAL CA: CPT

## 2022-04-23 PROCEDURE — 80076 HEPATIC FUNCTION PANEL: CPT

## 2022-04-23 RX ADMIN — SODIUM CHLORIDE, SODIUM LACTATE, POTASSIUM CHLORIDE, AND CALCIUM CHLORIDE SCH MLS/HR: .6; .31; .03; .02 INJECTION, SOLUTION INTRAVENOUS at 23:45

## 2022-04-23 NOTE — PHYS DOC
Past History


Past Medical History:  Renal Failure, Other


Additional Past Medical Histor:  kidney failure -age [age]; cyst on rt hip


Past Surgical History:  Other


Additional Past Surgical Histo:  EYE, CYST REMOVED FROM HIP


Smoking:  Quit Greater Than 1 Year


Alcohol Use:  Rarely


Drug Use:  None





General Adult


EDM:


Chief Complaint:  OTHER COMPLAINTS





HPI:


HPI:


Dr. Moon said I need to get checked out..,,, work then sent me in to get 

checked out...  I was here Thursday.. jessica same thing...  ".. but they gave 

some narcotic,..,,and it really screwed me up..."  " Work said I should come 

back in tonight..."





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   Hx. HTN 

and chest pain episodes.  Patient does have a scheduled appointment this coming 

week with cardiology.  Patient did not get flu vaccination.  Patient did not get

COVID vaccination.  No recent travel.  No history of trauma.  Multiple inmates 

at the [facility] have had or now have COVID infections.  Patient normally 

healthy.  Used to smoke but no longer smokes for the past year..  Patient has a 

remote history of kidney failure as a kid age [age].  Had a surgical repair of 

his cyst on his right hip.  Patient appears to be having somatic complaints over

the past year.  Some episodes are associated chest pain, fatigue, syncopal 

episodes, atypical sensations in his arms legs.  Patient denies any drug use.  

No history of fever or chills.  No history of trauma.  Reviewed prior work-up in

the ED on 22 .





Review of Systems:


Review of Systems:


Constitutional:  Denies fever or chills 


Eyes:  Denies change in visual acuity 


HENT:  Denies nasal congestion or sore throat 


Respiratory:  Denies cough or shortness of breath 


Cardiovascular: Episodic chest wall pain 


GI:  Denies abdominal pain, nausea, vomiting, bloody stools or diarrhea 


: Denies dysuria 


Musculoskeletal:  Denies back pain or joint pain 


Integument:  Denies rash 


Neurologic:  Denies headache, focal weakness or sensory changes.  Complains of 

generalized fatigue and weakness


Endocrine:  Denies polyuria or polydipsia 


Lymphatic:  Denies swollen glands 


Psychiatric: Complains of confusion and  anxiety





Family History:


Family History:


Noncontributory the presentation.





Current Medications:


Current Meds:


See nursing for home meds





Allergies:


Allergies:





Allergies








Coded Allergies Type Severity Reaction Last Updated Verified


 


  No Known Drug Allergies    10/17/20 No











Physical Exam:


PE:





Constitutional: Well developed, well nourished, no acute distress, non-toxic 

appearance. []


HENT: Normocephalic, atraumatic, bilateral external ears normal, oropharynx 

moist, no oral exudates, nose mild nasal turbinate congestion and clear rhino

rrhea


Eyes: PERRLA, EOMI, conjunctiva normal, no discharge. [] 


Neck: Normal range of motion, no tenderness, supple, no stridor. [] 


Cardiovascular:Heart rate regular rhythm, no murmur []


Lungs & Thorax:  Bilateral breath sounds equal at apex auscultation []


Abdomen: Bowel sounds normal, soft, no tenderness, no masses, no pulsatile 

masses.  Scar right hip


Skin: Warm, dry, no erythema, no rash. [] 


Back: No tenderness, no CVA tenderness. [] 


Extremities: No tenderness, no cyanosis, no clubbing, ROM intact, no edema. [] 


Neurologic: Alert and oriented X 3, moves all extremities on request, peers have

 distal sensory, no focal deficits noted. [DTRs +2 patella and brachial.   

equal.  Right-hand-dominant.  Ambulatory without problems.


Psychologic: Affect anxious,, judgement normal, mood normal. []





EKG:


EKG:


My interpretation EKG is sinus rhythm at 76 bpm.  No acute morphology.  Does 

have slightly prolonged ID interval of 232 ms.  Time is EKG at interpretation is

 2349 hrs. []





Radiology/Procedures:


Radiology/Procedures:


Reviewed previous x-rays [] and CT.  [facility] [address], [place] [address]


                                 [phone]


                                        


                                 IMAGING REPORT





                                     Signed





PATIENT: VENTURA MEJIA   ACCOUNT: [id number]     MRN#: [id number]


: 2000           LOCATION: ER              AGE: 21


SEX: M                    EXAM DT: 22         ACCESSION#: [id number]


STATUS: REG ER            ORD. PHYSICIAN: JANAE GURROLA MD


REASON: cp


PROCEDURE: PORTABLE CHEST 1V





Study: XR CHEST 1V





Indication: Chest pain.





Comparison: 2022





Findings:





The cardiomediastinal silhouette and masoud are within normal limits. No localized

 airspace opacity, pleural effusion or pneumothorax.





Impression:





No acute radiographic abnormality of the chest. No relevant change from the 

2022 comparison. 





Electronically signed by: GABRIEL CIFUENTES MD (2022 12:29 AM) [facility]














DICTATED AND SIGNED BY:     GABRIEL CIFUENTES MD


DATE:     22 0029





CC: JANAE GURROLA MD; PCP,NO ~





Heart Score:


C/O Chest Pain:  N/A


HEART Score for Chest Pain:  








HEART Score for Chest Pain Response (Comments) Value


 


History Slighlty/Non-Suspicious 0


 


ECG Normal 0


 


Age < 45 0


 


Risk Factors No Risk Factors 0


 


Troponin < Normal Limit 0


 


Total  0








Risk Factors:


Risk Factors:  DM, Current or recent (<one month) smoker, HTN, HLP, family 

history of CAD, obesity.


Risk Scores:


Score 0 - 3:  2.5% MACE over next 6 weeks - Discharge Home


Score 4 - 6:  20.3% MACE over next 6 weeks - Admit for Clinical Observation


Score 7 - 10:  72.7% MACE over next 6 weeks - Early Invasive Strategies





Course & Med Decision Making:


Course & Med Decision Making


Pertinent Labs and Imaging studies reviewed. (See chart for details)





Get adequate rest.  Push fluids.  This may be a viral syndrome.  Consider other 

etiologies for weakness and vacillating somatic and physical complaints..  Keep 

follow-up with cardiology as scheduled.  Return if any concerns.  Take Tylenol 

and ibuprofen for painful episodes.  Consider early diagnosis of MS.   or other 

autoimmune diseases.  Consider stress on work department at the intermediate.





Impression:





1.  Confusion


2.  Fatigue


3.  Suspect Viral Syndrome





4.  Consider other causes vacillating symptoms-such as autoimmune disorders, 

inflammatory disorders, multiple sclerosis, dysrhythmia etc.


[]





Dragon Disclaimer:


Dragon Disclaimer:


This electronic medical record was generated, in whole or in part, using a voice

 recognition dictation system.





Departure


Departure:


Referrals:  


[facility] (PCP)





Dragon Disclaimer


This chart was dictated in whole or in part using Voice Recognition software in 

a busy, high-work load, and often noisy Emergency Department environment.  It 

may contain unintended and wholly unrecognized errors or omissions.











JANAE GURROLA MD           2022 22:21

## 2022-04-24 VITALS — DIASTOLIC BLOOD PRESSURE: 78 MMHG | SYSTOLIC BLOOD PRESSURE: 112 MMHG

## 2022-04-24 LAB
ALBUMIN SERPL-MCNC: 4.4 G/DL (ref 3.4–5)
ALP SERPL-CCNC: 94 U/L (ref 46–116)
ALT SERPL-CCNC: 50 U/L (ref 16–63)
AMPHETAMINE/METHAMPHETAMINE: (no result)
ANION GAP SERPL CALC-SCNC: 9 MMOL/L (ref 6–14)
APTT PPP: YELLOW S
AST SERPL-CCNC: 23 U/L (ref 15–37)
BACTERIA #/AREA URNS HPF: (no result) /HPF
BARBITURATES UR-MCNC: (no result) UG/ML
BASOPHILS # BLD AUTO: 0.1 X10^3/UL (ref 0–0.2)
BASOPHILS NFR BLD: 1 % (ref 0–3)
BENZODIAZ UR-MCNC: (no result) UG/L
BILIRUB DIRECT SERPL-MCNC: 0.1 MG/DL (ref 0–0.2)
BILIRUB SERPL-MCNC: 0.7 MG/DL (ref 0.2–1)
CA-I SERPL ISE-MCNC: 14 MG/DL (ref 8–26)
CALCIUM SERPL-MCNC: 9.6 MG/DL (ref 8.5–10.1)
CANNABINOIDS UR-MCNC: (no result) UG/L
CHLORIDE SERPL-SCNC: 101 MMOL/L (ref 98–107)
CO2 SERPL-SCNC: 29 MMOL/L (ref 21–32)
COCAINE UR-MCNC: (no result) NG/ML
CREAT SERPL-MCNC: 1 MG/DL (ref 0.7–1.3)
EOSINOPHIL NFR BLD: 0 % (ref 0–3)
EOSINOPHIL NFR BLD: 0 X10^3/UL (ref 0–0.7)
ERYTHROCYTE [DISTWIDTH] IN BLOOD BY AUTOMATED COUNT: 12.8 % (ref 11.5–14.5)
FIBRINOGEN PPP-MCNC: CLEAR MG/DL
GFR SERPLBLD BASED ON 1.73 SQ M-ARVRAT: 92.6 ML/MIN
GLUCOSE SERPL-MCNC: 87 MG/DL (ref 70–99)
GLUCOSE UR STRIP-MCNC: (no result) MG/DL
HCT VFR BLD CALC: 46.1 % (ref 39–53)
HGB BLD-MCNC: 15.5 G/DL (ref 13–17.5)
INFLUENZA A PATIENT: NEGATIVE
INFLUENZA B PATIENT: NEGATIVE
LIPASE: 87 U/L (ref 73–393)
LYMPHOCYTES # BLD: 1.6 X10^3/UL (ref 1–4.8)
LYMPHOCYTES NFR BLD AUTO: 19 % (ref 24–48)
MAGNESIUM SERPL-MCNC: 2.2 MG/DL (ref 1.8–2.4)
MCH RBC QN AUTO: 32 PG (ref 25–35)
MCHC RBC AUTO-ENTMCNC: 34 G/DL (ref 31–37)
MCV RBC AUTO: 94 FL (ref 79–100)
METHADONE SERPL-MCNC: (no result) NG/ML
MONO #: 0.4 X10^3/UL (ref 0–1.1)
MONOCYTES NFR BLD: 5 % (ref 0–9)
NEUT #: 6.5 X10^3UL (ref 1.8–7.7)
NEUTROPHILS NFR BLD AUTO: 75 % (ref 31–73)
NITRITE UR QL STRIP: (no result)
OPIATES UR-MCNC: (no result) NG/ML
PCP SERPL-MCNC: (no result) MG/DL
PLATELET # BLD AUTO: 244 X10^3/UL (ref 140–400)
POTASSIUM SERPL-SCNC: 4.2 MMOL/L (ref 3.5–5.1)
PROT SERPL-MCNC: 7.9 G/DL (ref 6.4–8.2)
RBC # BLD AUTO: 4.88 X10^6/UL (ref 4.3–5.7)
RBC #/AREA URNS HPF: (no result) /HPF (ref 0–2)
SODIUM SERPL-SCNC: 139 MMOL/L (ref 136–145)
SP GR UR STRIP: >=1.03
SQUAMOUS #/AREA URNS LPF: (no result) /LPF
UROBILINOGEN UR-MCNC: 0.2 MG/DL
WBC # BLD AUTO: 8.6 X10^3/UL (ref 4–11)
WBC #/AREA URNS HPF: (no result) /HPF (ref 0–4)

## 2022-04-24 NOTE — EKG
[facility] [address], [place] [address]

Test Date:    2022               Test Time:    23:49:35

Pat Name:     VENTURA MEJIA             Department:   

Patient ID:   [id number]           Room:          

Gender:       M                        Technician:   

:          2000               Requested By: JANAE GURROLA

Order Number: [id number]            Reading MD:   Stephen Wade

                                 Measurements

Intervals                              Axis          

Rate:         76                       P:            48

AR:           232                      QRS:          61

QRSD:         96                       T:            36

QT:           336                                    

QTc:          382                                    

                           Interpretive Statements

SINUS RHYTHM

PROLONGED AR INTERVAL

Electronically Signed On 2022 17:29:10 CDT by Stephen Wade

## 2022-04-24 NOTE — RAD
Study: XR CHEST 1V



Indication: Chest pain.



Comparison: 4/21/2022



Findings:



The cardiomediastinal silhouette and masoud are within normal limits. No localized airspace opacity, pl
eural effusion or pneumothorax.



Impression:



No acute radiographic abnormality of the chest. No relevant change from the 4/21/2022 comparison. 



Electronically signed by: GABRIEL CIFUENTES MD (4/24/2022 12:29 AM) [facility]